# Patient Record
Sex: FEMALE | Race: WHITE | NOT HISPANIC OR LATINO | Employment: OTHER | URBAN - METROPOLITAN AREA
[De-identification: names, ages, dates, MRNs, and addresses within clinical notes are randomized per-mention and may not be internally consistent; named-entity substitution may affect disease eponyms.]

---

## 2017-05-09 ENCOUNTER — COMMUNICATION - HEALTHEAST (OUTPATIENT)
Dept: SCHEDULING | Facility: CLINIC | Age: 67
End: 2017-05-09

## 2017-05-10 ENCOUNTER — OFFICE VISIT - HEALTHEAST (OUTPATIENT)
Dept: FAMILY MEDICINE | Facility: CLINIC | Age: 67
End: 2017-05-10

## 2017-05-10 DIAGNOSIS — J30.9 ALLERGIC RHINITIS: ICD-10-CM

## 2017-05-10 DIAGNOSIS — S39.92XA INJURY OF COCCYX, INITIAL ENCOUNTER: ICD-10-CM

## 2017-05-10 ASSESSMENT — MIFFLIN-ST. JEOR: SCORE: 1134.57

## 2017-05-19 ENCOUNTER — COMMUNICATION - HEALTHEAST (OUTPATIENT)
Dept: SCHEDULING | Facility: CLINIC | Age: 67
End: 2017-05-19

## 2017-05-20 ENCOUNTER — COMMUNICATION - HEALTHEAST (OUTPATIENT)
Dept: SCHEDULING | Facility: CLINIC | Age: 67
End: 2017-05-20

## 2017-07-31 ENCOUNTER — COMMUNICATION - HEALTHEAST (OUTPATIENT)
Dept: FAMILY MEDICINE | Facility: CLINIC | Age: 67
End: 2017-07-31

## 2017-07-31 DIAGNOSIS — G47.00 INSOMNIA: ICD-10-CM

## 2018-01-16 ENCOUNTER — COMMUNICATION - HEALTHEAST (OUTPATIENT)
Dept: FAMILY MEDICINE | Facility: CLINIC | Age: 68
End: 2018-01-16

## 2018-01-16 DIAGNOSIS — G47.00 INSOMNIA: ICD-10-CM

## 2018-04-04 ENCOUNTER — COMMUNICATION - HEALTHEAST (OUTPATIENT)
Dept: SCHEDULING | Facility: CLINIC | Age: 68
End: 2018-04-04

## 2018-04-05 ENCOUNTER — COMMUNICATION - HEALTHEAST (OUTPATIENT)
Dept: TELEHEALTH | Facility: CLINIC | Age: 68
End: 2018-04-05

## 2018-04-05 ENCOUNTER — OFFICE VISIT - HEALTHEAST (OUTPATIENT)
Dept: FAMILY MEDICINE | Facility: CLINIC | Age: 68
End: 2018-04-05

## 2018-04-05 DIAGNOSIS — F41.8 SITUATIONAL ANXIETY: ICD-10-CM

## 2018-04-05 DIAGNOSIS — R19.7 DIARRHEA: ICD-10-CM

## 2018-04-05 DIAGNOSIS — M62.838 MUSCLE SPASM: ICD-10-CM

## 2018-04-05 ASSESSMENT — MIFFLIN-ST. JEOR: SCORE: 1128.05

## 2018-04-23 ENCOUNTER — OFFICE VISIT - HEALTHEAST (OUTPATIENT)
Dept: FAMILY MEDICINE | Facility: CLINIC | Age: 68
End: 2018-04-23

## 2018-04-23 DIAGNOSIS — J30.9 ALLERGIC RHINITIS: ICD-10-CM

## 2018-04-23 DIAGNOSIS — Z12.4 SCREENING FOR CERVICAL CANCER: ICD-10-CM

## 2018-04-23 DIAGNOSIS — Z13.220 SCREENING FOR LIPID DISORDERS: ICD-10-CM

## 2018-04-23 DIAGNOSIS — Z13.1 SCREENING FOR DIABETES MELLITUS: ICD-10-CM

## 2018-04-23 DIAGNOSIS — Z12.31 VISIT FOR SCREENING MAMMOGRAM: ICD-10-CM

## 2018-04-23 DIAGNOSIS — Z00.00 ROUTINE GENERAL MEDICAL EXAMINATION AT A HEALTH CARE FACILITY: ICD-10-CM

## 2018-04-23 DIAGNOSIS — Z12.11 SCREEN FOR COLON CANCER: ICD-10-CM

## 2018-04-23 DIAGNOSIS — Z86.2 HISTORY OF ANEMIA: ICD-10-CM

## 2018-04-23 DIAGNOSIS — F41.8 SITUATIONAL ANXIETY: ICD-10-CM

## 2018-04-23 LAB
CHOLEST SERPL-MCNC: 226 MG/DL
FASTING STATUS PATIENT QL REPORTED: YES
FASTING STATUS PATIENT QL REPORTED: YES
GLUCOSE BLD-MCNC: 85 MG/DL (ref 70–99)
HDLC SERPL-MCNC: 66 MG/DL
HGB BLD-MCNC: 13.4 G/DL (ref 12–16)
LDLC SERPL CALC-MCNC: 147 MG/DL
TRIGL SERPL-MCNC: 66 MG/DL

## 2018-04-23 ASSESSMENT — MIFFLIN-ST. JEOR: SCORE: 1102.25

## 2018-04-24 LAB
HPV SOURCE: NORMAL
HUMAN PAPILLOMA VIRUS 16 DNA: NEGATIVE
HUMAN PAPILLOMA VIRUS 18 DNA: NEGATIVE
HUMAN PAPILLOMA VIRUS FINAL DIAGNOSIS: NORMAL
HUMAN PAPILLOMA VIRUS OTHER HR: NEGATIVE
SPECIMEN DESCRIPTION: NORMAL

## 2018-04-26 ENCOUNTER — COMMUNICATION - HEALTHEAST (OUTPATIENT)
Dept: FAMILY MEDICINE | Facility: CLINIC | Age: 68
End: 2018-04-26

## 2018-04-26 DIAGNOSIS — G47.00 INSOMNIA: ICD-10-CM

## 2018-04-27 LAB
BKR LAB AP ABNORMAL BLEEDING: NO
BKR LAB AP BIRTH CONTROL/HORMONES: NORMAL
BKR LAB AP CERVICAL APPEARANCE: NORMAL
BKR LAB AP GYN ADEQUACY: NORMAL
BKR LAB AP GYN INTERPRETATION: NORMAL
BKR LAB AP HPV REFLEX: NORMAL
BKR LAB AP LMP: 1997
BKR LAB AP PATIENT STATUS: NORMAL
BKR LAB AP PREVIOUS ABNORMAL: NORMAL
BKR LAB AP PREVIOUS NORMAL: 2012
HIGH RISK?: NO
PATH REPORT.COMMENTS IMP SPEC: NORMAL
RESULT FLAG (HE HISTORICAL CONVERSION): NORMAL

## 2018-06-04 ENCOUNTER — HOSPITAL ENCOUNTER (OUTPATIENT)
Dept: MAMMOGRAPHY | Facility: CLINIC | Age: 68
Discharge: HOME | End: 2018-06-04
Attending: FAMILY MEDICINE

## 2018-06-04 DIAGNOSIS — Z12.31 VISIT FOR SCREENING MAMMOGRAM: ICD-10-CM

## 2018-09-25 ENCOUNTER — OFFICE VISIT - HEALTHEAST (OUTPATIENT)
Dept: FAMILY MEDICINE | Facility: CLINIC | Age: 68
End: 2018-09-25

## 2018-09-25 ENCOUNTER — RECORDS - HEALTHEAST (OUTPATIENT)
Dept: GENERAL RADIOLOGY | Facility: CLINIC | Age: 68
End: 2018-09-25

## 2018-09-25 DIAGNOSIS — R10.9 ACUTE RIGHT FLANK PAIN: ICD-10-CM

## 2018-09-25 DIAGNOSIS — R10.9 UNSPECIFIED ABDOMINAL PAIN: ICD-10-CM

## 2018-09-25 LAB
ALBUMIN SERPL-MCNC: 4 G/DL (ref 3.5–5)
ALBUMIN UR-MCNC: NEGATIVE MG/DL
ALP SERPL-CCNC: 59 U/L (ref 45–120)
ALT SERPL W P-5'-P-CCNC: 10 U/L (ref 0–45)
ANION GAP SERPL CALCULATED.3IONS-SCNC: 9 MMOL/L (ref 5–18)
APPEARANCE UR: CLEAR
AST SERPL W P-5'-P-CCNC: 14 U/L (ref 0–40)
BASOPHILS # BLD AUTO: 0.1 THOU/UL (ref 0–0.2)
BASOPHILS NFR BLD AUTO: 1 % (ref 0–2)
BILIRUB SERPL-MCNC: 0.5 MG/DL (ref 0–1)
BILIRUB UR QL STRIP: NEGATIVE
BUN SERPL-MCNC: 13 MG/DL (ref 8–22)
CALCIUM SERPL-MCNC: 9.5 MG/DL (ref 8.5–10.5)
CHLORIDE BLD-SCNC: 105 MMOL/L (ref 98–107)
CO2 SERPL-SCNC: 26 MMOL/L (ref 22–31)
COLOR UR AUTO: YELLOW
CREAT SERPL-MCNC: 0.65 MG/DL (ref 0.6–1.1)
EOSINOPHIL # BLD AUTO: 0.1 THOU/UL (ref 0–0.4)
EOSINOPHIL NFR BLD AUTO: 2 % (ref 0–6)
ERYTHROCYTE [DISTWIDTH] IN BLOOD BY AUTOMATED COUNT: 11 % (ref 11–14.5)
GFR SERPL CREATININE-BSD FRML MDRD: >60 ML/MIN/1.73M2
GLUCOSE BLD-MCNC: 90 MG/DL (ref 70–125)
GLUCOSE UR STRIP-MCNC: NEGATIVE MG/DL
HCT VFR BLD AUTO: 37.9 % (ref 35–47)
HGB BLD-MCNC: 13 G/DL (ref 12–16)
HGB UR QL STRIP: ABNORMAL
KETONES UR STRIP-MCNC: NEGATIVE MG/DL
LEUKOCYTE ESTERASE UR QL STRIP: NEGATIVE
LIPASE SERPL-CCNC: 29 U/L (ref 0–52)
LYMPHOCYTES # BLD AUTO: 2 THOU/UL (ref 0.8–4.4)
LYMPHOCYTES NFR BLD AUTO: 29 % (ref 20–40)
MCH RBC QN AUTO: 30.5 PG (ref 27–34)
MCHC RBC AUTO-ENTMCNC: 34.2 G/DL (ref 32–36)
MCV RBC AUTO: 89 FL (ref 80–100)
MONOCYTES # BLD AUTO: 0.6 THOU/UL (ref 0–0.9)
MONOCYTES NFR BLD AUTO: 9 % (ref 2–10)
NEUTROPHILS # BLD AUTO: 4.2 THOU/UL (ref 2–7.7)
NEUTROPHILS NFR BLD AUTO: 60 % (ref 50–70)
NITRATE UR QL: NEGATIVE
PH UR STRIP: 7 [PH] (ref 5–8)
PLATELET # BLD AUTO: 216 THOU/UL (ref 140–440)
PMV BLD AUTO: 8.6 FL (ref 7–10)
POTASSIUM BLD-SCNC: 4.5 MMOL/L (ref 3.5–5)
PROT SERPL-MCNC: 6.4 G/DL (ref 6–8)
RBC # BLD AUTO: 4.24 MILL/UL (ref 3.8–5.4)
RBC #/AREA URNS AUTO: ABNORMAL HPF
SODIUM SERPL-SCNC: 140 MMOL/L (ref 136–145)
SP GR UR STRIP: 1.02 (ref 1–1.03)
SQUAMOUS #/AREA URNS AUTO: ABNORMAL LPF
UROBILINOGEN UR STRIP-ACNC: ABNORMAL
WBC: 6.9 THOU/UL (ref 4–11)

## 2018-09-25 ASSESSMENT — MIFFLIN-ST. JEOR: SCORE: 1102.54

## 2018-10-04 ENCOUNTER — COMMUNICATION - HEALTHEAST (OUTPATIENT)
Dept: FAMILY MEDICINE | Facility: CLINIC | Age: 68
End: 2018-10-04

## 2018-10-04 DIAGNOSIS — J44.9 COPD (CHRONIC OBSTRUCTIVE PULMONARY DISEASE) (H): ICD-10-CM

## 2019-03-04 ENCOUNTER — COMMUNICATION - HEALTHEAST (OUTPATIENT)
Dept: FAMILY MEDICINE | Facility: CLINIC | Age: 69
End: 2019-03-04

## 2019-03-04 DIAGNOSIS — G47.00 INSOMNIA: ICD-10-CM

## 2019-03-19 ENCOUNTER — COMMUNICATION - HEALTHEAST (OUTPATIENT)
Dept: FAMILY MEDICINE | Facility: CLINIC | Age: 69
End: 2019-03-19

## 2019-03-19 DIAGNOSIS — F41.8 SITUATIONAL ANXIETY: ICD-10-CM

## 2019-06-21 ENCOUNTER — OFFICE VISIT - HEALTHEAST (OUTPATIENT)
Dept: FAMILY MEDICINE | Facility: CLINIC | Age: 69
End: 2019-06-21

## 2019-06-21 DIAGNOSIS — Z12.11 SCREEN FOR COLON CANCER: ICD-10-CM

## 2019-06-21 DIAGNOSIS — S32.9XXA CLOSED NONDISPLACED FRACTURE OF PELVIS, UNSPECIFIED PART OF PELVIS, INITIAL ENCOUNTER (H): ICD-10-CM

## 2019-06-21 DIAGNOSIS — S02.40CA CLOSED FRACTURE OF RIGHT SIDE OF MAXILLA, INITIAL ENCOUNTER (H): ICD-10-CM
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
## 2019-06-21 ASSESSMENT — MIFFLIN-ST. JEOR: SCORE: 1092.9

## 2019-06-26 ENCOUNTER — RECORDS - HEALTHEAST (OUTPATIENT)
Dept: ADMINISTRATIVE | Facility: OTHER | Age: 69
End: 2019-06-26

## 2019-06-27 ENCOUNTER — OFFICE VISIT (OUTPATIENT)
Dept: ORTHOPEDICS | Facility: CLINIC | Age: 69
End: 2019-06-27
Payer: COMMERCIAL

## 2019-06-27 VITALS
RESPIRATION RATE: 16 BRPM | HEART RATE: 72 BPM | WEIGHT: 125.7 LBS | SYSTOLIC BLOOD PRESSURE: 149 MMHG | BODY MASS INDEX: 19.05 KG/M2 | HEIGHT: 68 IN | DIASTOLIC BLOOD PRESSURE: 73 MMHG
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
## 2019-06-27 DIAGNOSIS — S32.592A CLOSED BILATERAL FRACTURE OF PUBIC RAMI, INITIAL ENCOUNTER (H): Primary | ICD-10-CM

## 2019-06-27 DIAGNOSIS — S32.10XA CLOSED FRACTURE OF SACRUM, UNSPECIFIED PORTION OF SACRUM, INITIAL ENCOUNTER (H): ICD-10-CM

## 2019-06-27 DIAGNOSIS — S32.591A CLOSED BILATERAL FRACTURE OF PUBIC RAMI, INITIAL ENCOUNTER (H): Primary | ICD-10-CM

## 2019-06-27 PROCEDURE — 99204 OFFICE O/P NEW MOD 45 MIN: CPT | Performed by: ORTHOPAEDIC SURGERY

## 2019-06-27 RX ORDER — ALBUTEROL SULFATE 90 UG/1
AEROSOL, METERED RESPIRATORY (INHALATION)
COMMUNITY
Start: 2018-10-06 | End: 2022-10-26

## 2019-06-27 RX ORDER — TRAZODONE HYDROCHLORIDE 50 MG/1
50 TABLET, FILM COATED ORAL AT BEDTIME
Refills: 1 | COMMUNITY
Start: 2019-05-30 | End: 2021-09-13

## 2019-06-27 RX ORDER — AZELASTINE HYDROCHLORIDE 0.5 MG/ML
SOLUTION/ DROPS OPHTHALMIC
COMMUNITY
Start: 2018-04-23 | End: 2023-03-19

## 2019-06-27 RX ORDER — NAPROXEN 500 MG/1
500 TABLET ORAL
COMMUNITY
Start: 2019-06-21 | End: 2021-09-13

## 2019-06-27 ASSESSMENT — PAIN SCALES - GENERAL: PAINLEVEL: EXTREME PAIN (9)

## 2019-06-27 ASSESSMENT — MIFFLIN-ST. JEOR: SCORE: 1138.67
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
## 2019-07-09 ENCOUNTER — TELEPHONE (OUTPATIENT)
Dept: ORTHOPEDICS | Facility: CLINIC | Age: 69
End: 2019-07-09

## 2019-07-09 NOTE — TELEPHONE ENCOUNTER
Patient would like a call regarding disability paperwork for the insurance company. Please call to advise.
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
## 2019-07-09 NOTE — TELEPHONE ENCOUNTER
Called and spoke to patient. I gave her our fax number that [place] could fax us the paperwork to fill out. Add claim # [id number]. She thanked me for calling.  Inge Lincoln Certified Medical Assistant

## 2019-07-10 NOTE — TELEPHONE ENCOUNTER
Called and spoke to patient. 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 I let her know I would write this letter today. She would like it faxed to [employer] at [phone]. I faxed the letter and confirmed receipt.   Inge Lincoln Certified Medical Assistant

## 2019-08-08 ENCOUNTER — OFFICE VISIT (OUTPATIENT)
Dept: ORTHOPEDICS | Facility: CLINIC | Age: 69
End: 2019-08-08
Payer: COMMERCIAL

## 2019-08-08 ENCOUNTER — ANCILLARY PROCEDURE (OUTPATIENT)
Dept: GENERAL RADIOLOGY | Facility: CLINIC | Age: 69
End: 2019-08-08
Attending: ORTHOPAEDIC SURGERY
Payer: COMMERCIAL

## 2019-08-08 VITALS
HEART RATE: 77 BPM | WEIGHT: 125 LBS | SYSTOLIC BLOOD PRESSURE: 133 MMHG | OXYGEN SATURATION: 94 % | BODY MASS INDEX: 19.01 KG/M2 | DIASTOLIC BLOOD PRESSURE: 67 MMHG
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
## 2019-08-08 DIAGNOSIS — S32.10XA CLOSED FRACTURE OF SACRUM, UNSPECIFIED PORTION OF SACRUM, INITIAL ENCOUNTER (H): ICD-10-CM
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
## 2019-08-08 DIAGNOSIS — S32.592A CLOSED BILATERAL FRACTURE OF PUBIC RAMI, INITIAL ENCOUNTER (H): ICD-10-CM

## 2019-08-08 DIAGNOSIS — S32.591D CLOSED BILATERAL FRACTURE OF PUBIC RAMI WITH ROUTINE HEALING, SUBSEQUENT ENCOUNTER: ICD-10-CM

## 2019-08-08 DIAGNOSIS — S32.10XD CLOSED FRACTURE OF SACRUM WITH ROUTINE HEALING, UNSPECIFIED PORTION OF SACRUM, SUBSEQUENT ENCOUNTER: Primary | ICD-10-CM

## 2019-08-08 DIAGNOSIS — S32.591A CLOSED BILATERAL FRACTURE OF PUBIC RAMI, INITIAL ENCOUNTER (H): ICD-10-CM

## 2019-08-08 DIAGNOSIS — S32.592D CLOSED BILATERAL FRACTURE OF PUBIC RAMI WITH ROUTINE HEALING, SUBSEQUENT ENCOUNTER: ICD-10-CM

## 2019-08-08 PROCEDURE — 72170 X-RAY EXAM OF PELVIS: CPT

## 2019-08-08 PROCEDURE — 99213 OFFICE O/P EST LOW 20 MIN: CPT | Performed by: ORTHOPAEDIC SURGERY

## 2019-08-08 ASSESSMENT — PAIN SCALES - GENERAL: PAINLEVEL: EXTREME PAIN (8)
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
## 2019-08-08 NOTE — LETTER
August 8, 2019      Gabby Donis  [address] [facility] [address]        To Whom It May Concern:    Gabby Donis was seen in our clinic. She may not return to work at this time.  Return to clinic in 6 weeks for further evaluation.      Sincerely,        Messi Landers MD

## 2019-08-30 ENCOUNTER — COMMUNICATION - HEALTHEAST (OUTPATIENT)
Dept: FAMILY MEDICINE | Facility: CLINIC | Age: 69
End: 2019-08-30

## 2019-08-30 DIAGNOSIS — G47.00 INSOMNIA: ICD-10-CM

## 2019-09-11 ENCOUNTER — COMMUNICATION - HEALTHEAST (OUTPATIENT)
Dept: FAMILY MEDICINE | Facility: CLINIC | Age: 69
End: 2019-09-11

## 2019-09-11 DIAGNOSIS — S02.40CA CLOSED FRACTURE OF RIGHT SIDE OF MAXILLA, INITIAL ENCOUNTER (H): ICD-10-CM

## 2019-09-11 DIAGNOSIS — S32.9XXA CLOSED NONDISPLACED FRACTURE OF PELVIS, UNSPECIFIED PART OF PELVIS, INITIAL ENCOUNTER (H): ICD-10-CM

## 2019-09-19 ENCOUNTER — ANCILLARY PROCEDURE (OUTPATIENT)
Dept: GENERAL RADIOLOGY | Facility: CLINIC | Age: 69
End: 2019-09-19
Attending: PHYSICIAN ASSISTANT
Payer: COMMERCIAL

## 2019-09-19 ENCOUNTER — OFFICE VISIT (OUTPATIENT)
Dept: ORTHOPEDICS | Facility: CLINIC | Age: 69
End: 2019-09-19
Payer: COMMERCIAL

## 2019-09-19 VITALS
SYSTOLIC BLOOD PRESSURE: 115 MMHG | HEIGHT: 68 IN | WEIGHT: 123.3 LBS | BODY MASS INDEX: 18.69 KG/M2 | DIASTOLIC BLOOD PRESSURE: 70 MMHG | RESPIRATION RATE: 16 BRPM

## 2019-09-19 DIAGNOSIS — S32.10XD CLOSED FRACTURE OF SACRUM WITH ROUTINE HEALING, UNSPECIFIED PORTION OF SACRUM, SUBSEQUENT ENCOUNTER: ICD-10-CM

## 2019-09-19 DIAGNOSIS — S32.592D CLOSED BILATERAL FRACTURE OF PUBIC RAMI WITH ROUTINE HEALING, SUBSEQUENT ENCOUNTER: Primary | ICD-10-CM

## 2019-09-19 DIAGNOSIS — S32.591D CLOSED BILATERAL FRACTURE OF PUBIC RAMI WITH ROUTINE HEALING, SUBSEQUENT ENCOUNTER: Primary | ICD-10-CM

## 2019-09-19 PROCEDURE — 99213 OFFICE O/P EST LOW 20 MIN: CPT | Performed by: ORTHOPAEDIC SURGERY

## 2019-09-19 PROCEDURE — 72170 X-RAY EXAM OF PELVIS: CPT

## 2019-09-19 ASSESSMENT — PAIN SCALES - GENERAL: PAINLEVEL: EXTREME PAIN (8)

## 2019-09-19 ASSESSMENT — MIFFLIN-ST. JEOR: SCORE: 1127.79
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
## 2019-10-31 ENCOUNTER — OFFICE VISIT (OUTPATIENT)
Dept: ORTHOPEDICS | Facility: CLINIC | Age: 69
End: 2019-10-31
Payer: COMMERCIAL

## 2019-10-31 ENCOUNTER — ANCILLARY PROCEDURE (OUTPATIENT)
Dept: GENERAL RADIOLOGY | Facility: CLINIC | Age: 69
End: 2019-10-31
Attending: ORTHOPAEDIC SURGERY
Payer: COMMERCIAL

## 2019-10-31 ENCOUNTER — COMMUNICATION - HEALTHEAST (OUTPATIENT)
Dept: FAMILY MEDICINE | Facility: CLINIC | Age: 69
End: 2019-10-31

## 2019-10-31 VITALS
DIASTOLIC BLOOD PRESSURE: 72 MMHG | BODY MASS INDEX: 18.73 KG/M2 | WEIGHT: 123.6 LBS | HEART RATE: 87 BPM | HEIGHT: 68 IN | SYSTOLIC BLOOD PRESSURE: 126 MMHG

## 2019-10-31 DIAGNOSIS — S32.591D CLOSED BILATERAL FRACTURE OF PUBIC RAMI WITH ROUTINE HEALING, SUBSEQUENT ENCOUNTER: Primary | ICD-10-CM

## 2019-10-31 DIAGNOSIS — S32.592D CLOSED BILATERAL FRACTURE OF PUBIC RAMI WITH ROUTINE HEALING, SUBSEQUENT ENCOUNTER: ICD-10-CM

## 2019-10-31 DIAGNOSIS — S32.591D CLOSED BILATERAL FRACTURE OF PUBIC RAMI WITH ROUTINE HEALING, SUBSEQUENT ENCOUNTER: ICD-10-CM

## 2019-10-31 DIAGNOSIS — M76.31 ILIOTIBIAL BAND TENDONITIS, RIGHT: ICD-10-CM

## 2019-10-31 DIAGNOSIS — S32.592D CLOSED BILATERAL FRACTURE OF PUBIC RAMI WITH ROUTINE HEALING, SUBSEQUENT ENCOUNTER: Primary | ICD-10-CM

## 2019-10-31 DIAGNOSIS — S02.40CA CLOSED FRACTURE OF RIGHT SIDE OF MAXILLA, INITIAL ENCOUNTER (H): ICD-10-CM

## 2019-10-31 DIAGNOSIS — S32.9XXA CLOSED NONDISPLACED FRACTURE OF PELVIS, UNSPECIFIED PART OF PELVIS, INITIAL ENCOUNTER (H): ICD-10-CM

## 2019-10-31 PROCEDURE — 72170 X-RAY EXAM OF PELVIS: CPT

## 2019-10-31 PROCEDURE — 99213 OFFICE O/P EST LOW 20 MIN: CPT | Performed by: ORTHOPAEDIC SURGERY

## 2019-10-31 ASSESSMENT — MIFFLIN-ST. JEOR: SCORE: 1129.15

## 2019-10-31 ASSESSMENT — PAIN SCALES - GENERAL: PAINLEVEL: EXTREME PAIN (8)
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
## 2019-12-04 ENCOUNTER — THERAPY VISIT (OUTPATIENT)
Dept: PHYSICAL THERAPY | Facility: CLINIC | Age: 69
End: 2019-12-04
Payer: COMMERCIAL

## 2019-12-04 DIAGNOSIS — S32.592D CLOSED BILATERAL FRACTURE OF PUBIC RAMI WITH ROUTINE HEALING, SUBSEQUENT ENCOUNTER: ICD-10-CM

## 2019-12-04 DIAGNOSIS — M25.551 PAIN IN JOINT, PELVIC REGION AND THIGH, RIGHT: ICD-10-CM

## 2019-12-04 DIAGNOSIS — M53.3 SI (SACROILIAC) JOINT DYSFUNCTION: ICD-10-CM

## 2019-12-04 DIAGNOSIS — R53.81 PHYSICAL DECONDITIONING: ICD-10-CM

## 2019-12-04 DIAGNOSIS — S32.591D CLOSED BILATERAL FRACTURE OF PUBIC RAMI WITH ROUTINE HEALING, SUBSEQUENT ENCOUNTER: ICD-10-CM

## 2019-12-04 DIAGNOSIS — M76.31 ILIOTIBIAL BAND TENDONITIS, RIGHT: ICD-10-CM

## 2019-12-04 PROCEDURE — 97161 PT EVAL LOW COMPLEX 20 MIN: CPT | Mod: GP | Performed by: PHYSICAL THERAPIST

## 2019-12-04 PROCEDURE — 97112 NEUROMUSCULAR REEDUCATION: CPT | Mod: GP | Performed by: PHYSICAL THERAPIST
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
## 2019-12-11 ENCOUNTER — THERAPY VISIT (OUTPATIENT)
Dept: PHYSICAL THERAPY | Facility: CLINIC | Age: 69
End: 2019-12-11
Payer: COMMERCIAL

## 2019-12-11 DIAGNOSIS — M53.3 SI (SACROILIAC) JOINT DYSFUNCTION: ICD-10-CM

## 2019-12-11 DIAGNOSIS — R53.81 PHYSICAL DECONDITIONING: ICD-10-CM

## 2019-12-11 DIAGNOSIS — M25.551 PAIN IN JOINT, PELVIC REGION AND THIGH, RIGHT: ICD-10-CM

## 2019-12-11 PROCEDURE — 97110 THERAPEUTIC EXERCISES: CPT | Mod: GP | Performed by: PHYSICAL THERAPIST

## 2019-12-11 PROCEDURE — 97112 NEUROMUSCULAR REEDUCATION: CPT | Mod: GP | Performed by: PHYSICAL THERAPIST

## 2019-12-19 ENCOUNTER — THERAPY VISIT (OUTPATIENT)
Dept: PHYSICAL THERAPY | Facility: CLINIC | Age: 69
End: 2019-12-19
Payer: COMMERCIAL

## 2019-12-19 DIAGNOSIS — R53.81 PHYSICAL DECONDITIONING: ICD-10-CM

## 2019-12-19 DIAGNOSIS — M53.3 SI (SACROILIAC) JOINT DYSFUNCTION: ICD-10-CM

## 2019-12-19 DIAGNOSIS — M25.551 PAIN IN JOINT, PELVIC REGION AND THIGH, RIGHT: ICD-10-CM

## 2019-12-19 PROCEDURE — 97110 THERAPEUTIC EXERCISES: CPT | Mod: GP | Performed by: PHYSICAL THERAPIST

## 2019-12-19 PROCEDURE — 97112 NEUROMUSCULAR REEDUCATION: CPT | Mod: GP | Performed by: PHYSICAL THERAPIST

## 2019-12-27 ENCOUNTER — COMMUNICATION - HEALTHEAST (OUTPATIENT)
Dept: FAMILY MEDICINE | Facility: CLINIC | Age: 69
End: 2019-12-27

## 2019-12-27 DIAGNOSIS — J30.9 ALLERGIC RHINITIS: ICD-10-CM

## 2020-01-02 ENCOUNTER — THERAPY VISIT (OUTPATIENT)
Dept: PHYSICAL THERAPY | Facility: CLINIC | Age: 70
End: 2020-01-02
Payer: COMMERCIAL

## 2020-01-02 DIAGNOSIS — R53.81 PHYSICAL DECONDITIONING: ICD-10-CM

## 2020-01-02 DIAGNOSIS — M25.551 PAIN IN JOINT, PELVIC REGION AND THIGH, RIGHT: ICD-10-CM

## 2020-01-02 DIAGNOSIS — M53.3 SI (SACROILIAC) JOINT DYSFUNCTION: ICD-10-CM

## 2020-01-02 PROCEDURE — 97112 NEUROMUSCULAR REEDUCATION: CPT | Mod: GP | Performed by: PHYSICAL THERAPIST

## 2020-01-02 PROCEDURE — 97530 THERAPEUTIC ACTIVITIES: CPT | Mod: GP | Performed by: PHYSICAL THERAPIST

## 2020-01-02 PROCEDURE — 97110 THERAPEUTIC EXERCISES: CPT | Mod: GP | Performed by: PHYSICAL THERAPIST

## 2020-01-06 ENCOUNTER — ANCILLARY PROCEDURE (OUTPATIENT)
Dept: GENERAL RADIOLOGY | Facility: CLINIC | Age: 70
End: 2020-01-06
Attending: ORTHOPAEDIC SURGERY
Payer: COMMERCIAL

## 2020-01-06 ENCOUNTER — OFFICE VISIT (OUTPATIENT)
Dept: ORTHOPEDICS | Facility: CLINIC | Age: 70
End: 2020-01-06
Payer: COMMERCIAL

## 2020-01-06 VITALS
WEIGHT: 123.1 LBS | SYSTOLIC BLOOD PRESSURE: 138 MMHG | HEIGHT: 68 IN | HEART RATE: 67 BPM | OXYGEN SATURATION: 96 % | BODY MASS INDEX: 18.66 KG/M2 | DIASTOLIC BLOOD PRESSURE: 57 MMHG

## 2020-01-06 DIAGNOSIS — S32.592D CLOSED BILATERAL FRACTURE OF PUBIC RAMI WITH ROUTINE HEALING, SUBSEQUENT ENCOUNTER: Primary | ICD-10-CM

## 2020-01-06 DIAGNOSIS — S32.591D CLOSED BILATERAL FRACTURE OF PUBIC RAMI WITH ROUTINE HEALING, SUBSEQUENT ENCOUNTER: Primary | ICD-10-CM

## 2020-01-06 DIAGNOSIS — S32.592D CLOSED BILATERAL FRACTURE OF PUBIC RAMI WITH ROUTINE HEALING, SUBSEQUENT ENCOUNTER: ICD-10-CM

## 2020-01-06 DIAGNOSIS — S32.591D CLOSED BILATERAL FRACTURE OF PUBIC RAMI WITH ROUTINE HEALING, SUBSEQUENT ENCOUNTER: ICD-10-CM

## 2020-01-06 DIAGNOSIS — M25.551 GREATER TROCHANTERIC PAIN SYNDROME OF RIGHT LOWER EXTREMITY: ICD-10-CM

## 2020-01-06 PROCEDURE — 20610 DRAIN/INJ JOINT/BURSA W/O US: CPT | Mod: RT | Performed by: ORTHOPAEDIC SURGERY

## 2020-01-06 PROCEDURE — 72170 X-RAY EXAM OF PELVIS: CPT

## 2020-01-06 PROCEDURE — 99213 OFFICE O/P EST LOW 20 MIN: CPT | Mod: 25 | Performed by: ORTHOPAEDIC SURGERY

## 2020-01-06 RX ORDER — BUPIVACAINE HYDROCHLORIDE 2.5 MG/ML
3 INJECTION, SOLUTION INFILTRATION; PERINEURAL
Status: DISCONTINUED | OUTPATIENT
Start: 2020-01-06 | End: 2021-09-13

## 2020-01-06 RX ORDER — LIDOCAINE HYDROCHLORIDE 10 MG/ML
4 INJECTION, SOLUTION INFILTRATION; PERINEURAL
Status: DISCONTINUED | OUTPATIENT
Start: 2020-01-06 | End: 2021-09-13

## 2020-01-06 RX ORDER — TRIAMCINOLONE ACETONIDE 40 MG/ML
80 INJECTION, SUSPENSION INTRA-ARTICULAR; INTRAMUSCULAR
Status: DISCONTINUED | OUTPATIENT
Start: 2020-01-06 | End: 2021-09-13

## 2020-01-06 RX ADMIN — BUPIVACAINE HYDROCHLORIDE 3 ML: 2.5 INJECTION, SOLUTION INFILTRATION; PERINEURAL at 13:30

## 2020-01-06 RX ADMIN — LIDOCAINE HYDROCHLORIDE 4 ML: 10 INJECTION, SOLUTION INFILTRATION; PERINEURAL at 13:30

## 2020-01-06 RX ADMIN — TRIAMCINOLONE ACETONIDE 80 MG: 40 INJECTION, SUSPENSION INTRA-ARTICULAR; INTRAMUSCULAR at 13:30

## 2020-01-06 ASSESSMENT — MIFFLIN-ST. JEOR: SCORE: 1121.88

## 2020-01-06 ASSESSMENT — PAIN SCALES - GENERAL: PAINLEVEL: SEVERE PAIN (7)
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
## 2020-01-23 ENCOUNTER — THERAPY VISIT (OUTPATIENT)
Dept: PHYSICAL THERAPY | Facility: CLINIC | Age: 70
End: 2020-01-23
Payer: COMMERCIAL

## 2020-01-23 DIAGNOSIS — M25.551 PAIN IN JOINT, PELVIC REGION AND THIGH, RIGHT: ICD-10-CM

## 2020-01-23 DIAGNOSIS — M53.3 SI (SACROILIAC) JOINT DYSFUNCTION: ICD-10-CM

## 2020-01-23 DIAGNOSIS — R53.81 PHYSICAL DECONDITIONING: Primary | ICD-10-CM

## 2020-01-23 PROCEDURE — 97140 MANUAL THERAPY 1/> REGIONS: CPT | Mod: GP | Performed by: PHYSICAL THERAPIST

## 2020-01-23 PROCEDURE — 97110 THERAPEUTIC EXERCISES: CPT | Mod: GP | Performed by: PHYSICAL THERAPIST

## 2020-01-23 PROCEDURE — 97530 THERAPEUTIC ACTIVITIES: CPT | Mod: GP | Performed by: PHYSICAL THERAPIST

## 2020-01-28 ENCOUNTER — COMMUNICATION - HEALTHEAST (OUTPATIENT)
Dept: FAMILY MEDICINE | Facility: CLINIC | Age: 70
End: 2020-01-28

## 2020-02-27 ENCOUNTER — OFFICE VISIT (OUTPATIENT)
Dept: ORTHOPEDICS | Facility: CLINIC | Age: 70
End: 2020-02-27
Payer: COMMERCIAL

## 2020-02-27 VITALS
HEIGHT: 68 IN | BODY MASS INDEX: 18.46 KG/M2 | WEIGHT: 121.8 LBS | DIASTOLIC BLOOD PRESSURE: 77 MMHG | SYSTOLIC BLOOD PRESSURE: 130 MMHG

## 2020-02-27 DIAGNOSIS — S32.592D CLOSED BILATERAL FRACTURE OF PUBIC RAMI WITH ROUTINE HEALING, SUBSEQUENT ENCOUNTER: Primary | ICD-10-CM

## 2020-02-27 DIAGNOSIS — S32.591D CLOSED BILATERAL FRACTURE OF PUBIC RAMI WITH ROUTINE HEALING, SUBSEQUENT ENCOUNTER: Primary | ICD-10-CM

## 2020-02-27 PROCEDURE — 99213 OFFICE O/P EST LOW 20 MIN: CPT | Performed by: ORTHOPAEDIC SURGERY

## 2020-02-27 ASSESSMENT — PAIN SCALES - GENERAL: PAINLEVEL: EXTREME PAIN (8)

## 2020-02-27 ASSESSMENT — MIFFLIN-ST. JEOR: SCORE: 1115.98
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
## 2020-03-04 ENCOUNTER — THERAPY VISIT (OUTPATIENT)
Dept: PHYSICAL THERAPY | Facility: CLINIC | Age: 70
End: 2020-03-04
Payer: COMMERCIAL

## 2020-03-04 DIAGNOSIS — M25.551 PAIN IN JOINT, PELVIC REGION AND THIGH, RIGHT: ICD-10-CM

## 2020-03-04 DIAGNOSIS — R53.81 PHYSICAL DECONDITIONING: ICD-10-CM

## 2020-03-04 DIAGNOSIS — M53.3 SI (SACROILIAC) JOINT DYSFUNCTION: ICD-10-CM

## 2020-03-04 PROCEDURE — 97110 THERAPEUTIC EXERCISES: CPT | Mod: GP | Performed by: PHYSICAL THERAPIST

## 2020-03-04 PROCEDURE — 97530 THERAPEUTIC ACTIVITIES: CPT | Mod: GP | Performed by: PHYSICAL THERAPIST

## 2020-03-04 PROCEDURE — 97112 NEUROMUSCULAR REEDUCATION: CPT | Mod: GP | Performed by: PHYSICAL THERAPIST

## 2020-03-04 NOTE — PROGRESS NOTES
Subjective:  HPI  Physical Exam                    Objective:  System    Physical Exam    General     ROS    Assessment/Plan:    PROGRESS  REPORT    Progress reporting period is from 12/4/19 to 3/4/2020.       SUBJECTIVE  Subjective changes noted by patient:  Pt returns after being on vacation. Did go see the MD and was cleared to return back to work for short periods of time. Went back for 3 hours on Sunday. Wasn't too sore after the shift.      Current Pain level: 6/10(off and on).      Initial Pain level: 8/10.   Changes in function:  Yes (See Goal flowsheet attached for changes in current functional level)  Adverse reaction to treatment or activity: None    OBJECTIVE  Changes noted in objective findings:  Yes,   Objective: endurance is improving however does continue to fatigue quickly overall. Does still get some pain with close chain activities on single leg.      ASSESSMENT/PLAN  Updated problem list and treatment plan: Diagnosis 1:  S/P pelvic fractures  Pain -  manual therapy, self management, education and home program  Decreased ROM/flexibility - manual therapy, therapeutic exercise, therapeutic activity and home program  Decreased strength - therapeutic exercise, therapeutic activities and home program  Impaired balance - neuro re-education, therapeutic activities and home program  Decreased proprioception - neuro re-education, therapeutic activities and home program  Impaired gait - gait training, assistive devices and home program  Impaired muscle performance - neuro re-education and home program  Decreased function - therapeutic activities and home program  STG/LTGs have been met or progress has been made towards goals:  Yes (See Goal flow sheet completed today.)  Assessment of Progress: The patient's condition is improving.  The patient's condition has potential to improve.  Self Management Plans:  Patient has been instructed in a home treatment program.  Patient  has been instructed in self  management of symptoms.  I have re-evaluated this patient and find that the nature, scope, duration and intensity of the therapy is appropriate for the medical condition of the patient.  Gabby continues to require the following intervention to meet STG and LTG's:  PT    Recommendations:  This patient would benefit from continued therapy.     Frequency:  1-2 X a month, once daily  Duration:  for 2-3 months        Please refer to the daily flowsheet for treatment today, total treatment time and time spent performing 1:1 timed codes.

## 2020-03-30 ENCOUNTER — COMMUNICATION - HEALTHEAST (OUTPATIENT)
Dept: FAMILY MEDICINE | Facility: CLINIC | Age: 70
End: 2020-03-30

## 2020-04-16 ENCOUNTER — COMMUNICATION - HEALTHEAST (OUTPATIENT)
Dept: FAMILY MEDICINE | Facility: CLINIC | Age: 70
End: 2020-04-16

## 2020-04-16 DIAGNOSIS — G47.00 INSOMNIA: ICD-10-CM

## 2020-08-03 ENCOUNTER — COMMUNICATION - HEALTHEAST (OUTPATIENT)
Dept: FAMILY MEDICINE | Facility: CLINIC | Age: 70
End: 2020-08-03

## 2020-08-03 DIAGNOSIS — G47.00 INSOMNIA: ICD-10-CM

## 2020-09-02 ENCOUNTER — COMMUNICATION - HEALTHEAST (OUTPATIENT)
Dept: SCHEDULING | Facility: CLINIC | Age: 70
End: 2020-09-02

## 2020-09-09 ENCOUNTER — OFFICE VISIT - HEALTHEAST (OUTPATIENT)
Dept: FAMILY MEDICINE | Facility: CLINIC | Age: 70
End: 2020-09-09

## 2020-09-09 DIAGNOSIS — Z78.0 POSTMENOPAUSAL: ICD-10-CM

## 2020-09-09 DIAGNOSIS — J44.9 COPD (CHRONIC OBSTRUCTIVE PULMONARY DISEASE) (H): ICD-10-CM

## 2020-09-09 DIAGNOSIS — R63.4 ABNORMAL WEIGHT LOSS: ICD-10-CM

## 2020-09-09 DIAGNOSIS — J30.2 SEASONAL ALLERGIC RHINITIS, UNSPECIFIED TRIGGER: ICD-10-CM

## 2020-09-09 DIAGNOSIS — Z12.31 VISIT FOR SCREENING MAMMOGRAM: ICD-10-CM

## 2020-09-09 DIAGNOSIS — Z00.00 ROUTINE GENERAL MEDICAL EXAMINATION AT A HEALTH CARE FACILITY: ICD-10-CM

## 2020-09-09 DIAGNOSIS — Z23 ENCOUNTER FOR IMMUNIZATION: ICD-10-CM

## 2020-09-09 DIAGNOSIS — Z23 INFLUENZA VACCINE NEEDED: ICD-10-CM

## 2020-09-09 DIAGNOSIS — K29.70 GASTRITIS WITHOUT BLEEDING, UNSPECIFIED CHRONICITY, UNSPECIFIED GASTRITIS TYPE: ICD-10-CM

## 2020-09-09 DIAGNOSIS — Z13.220 ENCOUNTER FOR SCREENING FOR LIPOID DISORDERS: ICD-10-CM

## 2020-09-09 DIAGNOSIS — T14.8XXA BRUISING: ICD-10-CM

## 2020-09-09 DIAGNOSIS — Z12.31 ENCOUNTER FOR SCREENING MAMMOGRAM FOR MALIGNANT NEOPLASM OF BREAST: ICD-10-CM

## 2020-09-09 DIAGNOSIS — Z13.1 ENCOUNTER FOR SCREENING FOR DIABETES MELLITUS: ICD-10-CM

## 2020-09-09 LAB
CHOLEST SERPL-MCNC: 216 MG/DL
ERYTHROCYTE [DISTWIDTH] IN BLOOD BY AUTOMATED COUNT: 11.4 % (ref 11–14.5)
FASTING STATUS PATIENT QL REPORTED: YES
FASTING STATUS PATIENT QL REPORTED: YES
GLUCOSE BLD-MCNC: 71 MG/DL (ref 70–99)
HCT VFR BLD AUTO: 42.9 % (ref 35–47)
HDLC SERPL-MCNC: 78 MG/DL
HGB BLD-MCNC: 14.1 G/DL (ref 12–16)
LDLC SERPL CALC-MCNC: 125 MG/DL
MCH RBC QN AUTO: 30.2 PG (ref 27–34)
MCHC RBC AUTO-ENTMCNC: 32.8 G/DL (ref 32–36)
MCV RBC AUTO: 92 FL (ref 80–100)
PLATELET # BLD AUTO: 206 THOU/UL (ref 140–440)
PMV BLD AUTO: 8.1 FL (ref 7–10)
RBC # BLD AUTO: 4.66 MILL/UL (ref 3.8–5.4)
TRIGL SERPL-MCNC: 66 MG/DL
TSH SERPL DL<=0.005 MIU/L-ACNC: 1.96 UIU/ML (ref 0.3–5)
WBC: 7.5 THOU/UL (ref 4–11)

## 2020-09-09 ASSESSMENT — MIFFLIN-ST. JEOR: SCORE: 1071.64

## 2020-10-30 ENCOUNTER — COMMUNICATION - HEALTHEAST (OUTPATIENT)
Dept: FAMILY MEDICINE | Facility: CLINIC | Age: 70
End: 2020-10-30

## 2020-10-30 DIAGNOSIS — G47.00 INSOMNIA: ICD-10-CM

## 2020-12-06 PROBLEM — M53.3 SI (SACROILIAC) JOINT DYSFUNCTION: Status: RESOLVED | Noted: 2019-12-04 | Resolved: 2020-12-06

## 2020-12-06 PROBLEM — R53.81 PHYSICAL DECONDITIONING: Status: RESOLVED | Noted: 2019-12-04 | Resolved: 2020-12-06

## 2020-12-06 PROBLEM — M25.551 PAIN IN JOINT, PELVIC REGION AND THIGH, RIGHT: Status: RESOLVED | Noted: 2019-12-04 | Resolved: 2020-12-06

## 2021-01-25 ENCOUNTER — COMMUNICATION - HEALTHEAST (OUTPATIENT)
Dept: FAMILY MEDICINE | Facility: CLINIC | Age: 71
End: 2021-01-25

## 2021-01-25 DIAGNOSIS — F41.8 SITUATIONAL ANXIETY: ICD-10-CM

## 2021-01-27 ENCOUNTER — TELEPHONE (OUTPATIENT)
Dept: FAMILY MEDICINE | Facility: CLINIC | Age: 71
End: 2021-01-27

## 2021-01-27 ENCOUNTER — COMMUNICATION - HEALTHEAST (OUTPATIENT)
Dept: ADMINISTRATIVE | Facility: CLINIC | Age: 71
End: 2021-01-27

## 2021-01-27 DIAGNOSIS — F40.243 ANXIETY WITH FLYING: Primary | ICD-10-CM

## 2021-01-27 RX ORDER — ALPRAZOLAM 0.5 MG
.5-1 TABLET ORAL 2 TIMES DAILY PRN
Qty: 10 TABLET | Refills: 0 | Status: SHIPPED | OUTPATIENT
Start: 2021-01-27

## 2021-01-27 NOTE — TELEPHONE ENCOUNTER
Reason for Call:  Other prescription    Detailed comments: Gabby is flying out to [place] on 2/9/21 and back on 2/17/21.  She was hoping for some anxiety medication for the flight there and back.  Please review and advise. Thank you..Brooklynn Austin    Phone Number Patient can be reached at: Home number on file [phone] (home)    Best Time: any time    Can we leave a detailed message on this number? YES    Call taken on 1/27/2021 at 1:58 PM by Brooklynn Austin

## 2021-02-28 ENCOUNTER — COMMUNICATION - HEALTHEAST (OUTPATIENT)
Dept: FAMILY MEDICINE | Facility: CLINIC | Age: 71
End: 2021-02-28

## 2021-03-28 ENCOUNTER — HEALTH MAINTENANCE LETTER (OUTPATIENT)
Age: 71
End: 2021-03-28

## 2021-05-26 ENCOUNTER — RECORDS - HEALTHEAST (OUTPATIENT)
Dept: ADMINISTRATIVE | Facility: CLINIC | Age: 71
End: 2021-05-26
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
## 2021-05-29 NOTE — PATIENT INSTRUCTIONS - HE
Max 3000mg tylenol per day (between Tylenol #3 and regular tylenol).   Use Naproxen 500mg twice daily.   May use Tylenol #3 4 times daily.    See orthopedics, see ENT.     Wait on colonoscopy.

## 2021-05-30 VITALS — WEIGHT: 127 LBS | HEIGHT: 68 IN | BODY MASS INDEX: 19.25 KG/M2

## 2021-05-31 NOTE — TELEPHONE ENCOUNTER
Refill Approved    Rx renewed per Medication Renewal Policy. Medication was last renewed on 3/7/19.    Mitzi Walls, [facility] Triage/Med Refill 8/30/2019     Requested Prescriptions   Pending Prescriptions Disp Refills     traZODone (DESYREL) 50 MG tablet [Pharmacy Med Name: TRAZODONE 50MG      TAB] 90 tablet 1     Sig: TAKE 1 TABLET BY MOUTH AT BEDTIME       Tricyclics/Misc Antidepressant/Antianxiety Meds Refill Protocol Passed - 8/30/2019 12:45 PM        Passed - PCP or prescribing provider visit in last year     Last office visit with prescriber/PCP: 1/15/2016 Anuradha Benton MD OR same dept: 6/21/2019 Lisbet Soto DO OR same specialty: 6/21/2019 Lisbet Soto DO  Last physical: 4/23/2018 Last MTM visit: Visit date not found   Next visit within 3 mo: Visit date not found  Next physical within 3 mo: Visit date not found  Prescriber OR PCP: Anuradha Benton MD  Last diagnosis associated with med order: 1. Insomnia  - traZODone (DESYREL) 50 MG tablet [Pharmacy Med Name: TRAZODONE 50MG      TAB]; TAKE 1 TABLET BY MOUTH AT BEDTIME  Dispense: 90 tablet; Refill: 1    If protocol passes may refill for 12 months if within 3 months of last provider visit (or a total of 15 months).

## 2021-06-01 VITALS — WEIGHT: 125.13 LBS | BODY MASS INDEX: 19.64 KG/M2 | HEIGHT: 67 IN

## 2021-06-01 VITALS — WEIGHT: 125.56 LBS | HEIGHT: 68 IN | BODY MASS INDEX: 19.03 KG/M2

## 2021-06-01 NOTE — TELEPHONE ENCOUNTER
RN cannot approve Refill Request    RN can NOT refill this medication med is not covered by policy/route to provider. Last office visit: 6/21/2019 Lisbet Soto DO Last Physical: Visit date not found Last MTM visit: Visit date not found Last visit same specialty: 6/21/2019 Lisbet Soto DO.  Next visit within 3 mo: Visit date not found  Next physical within 3 mo: Visit date not found      Ericka Garcia, [facility] Triage/Med Refill 9/11/2019    Requested Prescriptions   Pending Prescriptions Disp Refills     naproxen (NAPROSYN) 500 MG tablet [Pharmacy Med Name: NAPROXEN 500 MG TABLET] 60 tablet 2     Sig: TAKE 1 TABLET BY MOUTH TWICE A DAY WITH MEALS       There is no refill protocol information for this order

## 2021-06-02 VITALS — HEIGHT: 67 IN | BODY MASS INDEX: 19.65 KG/M2 | WEIGHT: 125.19 LBS

## 2021-06-02 NOTE — TELEPHONE ENCOUNTER
RN cannot approve Refill Request    RN can NOT refill this medication med is not covered by policy/route to provider. Last office visit: 6/21/2019 Lisbet Soto DO Last Physical: Visit date not found Last MTM visit: Visit date not found Last visit same specialty: 6/21/2019 Lisbet Soto DO.  Next visit within 3 mo: Visit date not found  Next physical within 3 mo: Visit date not found      Melanie Alberts, [facility] Triage/Med Refill 10/31/2019    Requested Prescriptions   Pending Prescriptions Disp Refills     naproxen (NAPROSYN) 500 MG tablet [Pharmacy Med Name: NAPROXEN 500 MG TABLET] 60 tablet 2     Sig: TAKE 1 TABLET BY MOUTH TWICE A DAY WITH MEALS       There is no refill protocol information for this order

## 2021-06-03 VITALS — BODY MASS INDEX: 19.31 KG/M2 | HEIGHT: 67 IN | WEIGHT: 123.06 LBS

## 2021-06-04 VITALS
BODY MASS INDEX: 18.58 KG/M2 | WEIGHT: 118.38 LBS | HEIGHT: 67 IN | DIASTOLIC BLOOD PRESSURE: 71 MMHG | RESPIRATION RATE: 16 BRPM | TEMPERATURE: 98.2 F | SYSTOLIC BLOOD PRESSURE: 129 MMHG | HEART RATE: 75 BPM

## 2021-06-04 NOTE — TELEPHONE ENCOUNTER
Refill Approved    Rx renewed per Medication Renewal Policy. Medication was last renewed on 4/23/18.    Nata Alvarado, [facility] Triage/Med Refill 12/27/2019     Requested Prescriptions   Pending Prescriptions Disp Refills     azelastine (OPTIVAR) 0.05 % ophthalmic solution 6 mL 12     Sig: Use one drop to each eye daily       Opthalmic Allergy Drops Refill Protocol Passed - 12/27/2019  4:31 AM        Passed - Patient has had office visit/physical in last 12 months     Last office visit with prescriber/PCP: 1/15/2016 Anuradha Benton MD OR same dept: 6/21/2019 Lisbet Soto DO OR same specialty: 6/21/2019 Lisbet Soto DO  Last physical: 4/23/2018 Last MTM visit: Visit date not found   Next visit within 3 mo: Visit date not found  Next physical within 3 mo: Visit date not found  Prescriber OR PCP: Anuradha Benton MD  Last diagnosis associated with med order: 1. Allergic rhinitis  - azelastine (OPTIVAR) 0.05 % ophthalmic solution; Use one drop to each eye daily  Dispense: 6 mL; Refill: 12    If protocol passes may refill for 12 months if within 3 months of last provider visit (or a total of 15 months).

## 2021-06-07 NOTE — TELEPHONE ENCOUNTER
Patient Returning Call  Reason for call:  Return call   Information relayed to patient:  Caller was informed of message below.   Patient has additional questions:  Yes  If YES, what are your questions/concerns:  Caller stated that she will wait to see how it goes and if she notices herself not getting better she will call to set up the phone visit.   Okay to leave a detailed message?: No call back needed

## 2021-06-07 NOTE — TELEPHONE ENCOUNTER
Refill Approved    Rx renewed per Medication Renewal Policy. Medication was last renewed on 8/30/19.    Mitzi Walls, [facility] Triage/Med Refill 4/17/2020     Requested Prescriptions   Pending Prescriptions Disp Refills     traZODone (DESYREL) 50 MG tablet [Pharmacy Med Name: traZODone HCl 50 MG Oral Tablet] 90 tablet 0     Sig: TAKE 1 TABLET BY MOUTH AT BEDTIME       Tricyclics/Misc Antidepressant/Antianxiety Meds Refill Protocol Passed - 4/16/2020  3:09 PM        Passed - PCP or prescribing provider visit in last year     Last office visit with prescriber/PCP: Visit date not found OR same dept: 6/21/2019 Lisbet Soto DO OR same specialty: 6/21/2019 Lisbet Soto DO  Last physical: 4/23/2018 Last MTM visit: Visit date not found   Next visit within 3 mo: Visit date not found  Next physical within 3 mo: Visit date not found  Prescriber OR PCP: Anuradha Benton MD  Last diagnosis associated with med order: 1. Insomnia  - traZODone (DESYREL) 50 MG tablet [Pharmacy Med Name: traZODone HCl 50 MG Oral Tablet]; TAKE 1 TABLET BY MOUTH AT BEDTIME  Dispense: 90 tablet; Refill: 0    If protocol passes may refill for 12 months if within 3 months of last provider visit (or a total of 15 months).

## 2021-06-10 NOTE — TELEPHONE ENCOUNTER
Please help this patient make a video appointment and route back to me for limited refill if needed    Thanks!    BB

## 2021-06-10 NOTE — PROGRESS NOTES
"  Chief Complaint   Patient presents with     Tailbone Pain     Pt fell down 6 stairs yesterday in basement     Leg Pain     Upper right leg pain         HPI:   Gabby Donis is a 66 y.o. female fell down stairs yesterday.  Tailbone hurts.  Using aleve with little help.  Icing.  Mild pain upper right leg.  No numbness, no weakness.  Had bowel movement this morning.    ROS:  A 12 point comprehensive review of systems was negative except as noted.     Medications:  Current Outpatient Prescriptions on File Prior to Visit   Medication Sig Dispense Refill     albuterol (PROVENTIL HFA;VENTOLIN HFA) 90 mcg/actuation inhaler Inhale 2 puffs every 6 (six) hours as needed for wheezing. 1 Inhaler 4     cyclobenzaprine (FLEXERIL) 5 MG tablet Take 1 tablet (5 mg total) by mouth 3 (three) times a day as needed for muscle spasms. 30 tablet 1     fluticasone (FLONASE) 50 mcg/actuation nasal spray 1 spray into each nostril daily.       tiotropium (SPIRIVA) 18 mcg inhalation capsule Place 1 capsule (2 puffs total) into inhaler and inhale daily. 30 capsule 11     traZODone (DESYREL) 50 MG tablet TAKE ONE TABLET BY MOUTH AT BEDTIME 30 tablet 6     No current facility-administered medications on file prior to visit.          Social History:  Social History   Substance Use Topics     Smoking status: Current Some Day Smoker     Smokeless tobacco: Not on file     Alcohol use Not on file         Physical Exam:   Vitals:    05/10/17 1014   BP: 122/66   Patient Site: Right Arm   Patient Position: Sitting   Cuff Size: Adult Regular   Pulse: 72   Resp: 16   Temp: 97.8  F (36.6  C)   TempSrc: Oral   Weight: 127 lb (57.6 kg)   Height: 5' 8\" (1.727 m)       GEN:  NAD  LUNGS:  Clear to auscultation without wheezing.  Normal effort.  HEART:  RRR without murmur, rub or gallop   BACK:  Tender over coccyx    Assessment/Plan:    1. Injury of coccyx, initial encounter  HYDROcodone-acetaminophen 5-325 mg per tablet   2. Allergic rhinitis  " fluticasone (FLONASE) 50 mcg/actuation nasal spray      Discussed injuries to coccyx involve symptomatic treatment.  Doughnut to sit on.  Frequent icing.  vicoden for severe pain otherwise ibuprofen or naproxen.  Watch for constipation.  Recheck for problems.    She requests refills of current medications unrelated to today's medical evaluation. These medications and related lab tests were reviewed with her and refilled.        The following portions of the patient's history were reviewed and updated as appropriate: allergies, current medications, past family history, past medical history, past social history, past surgical history and problem list.    Jorge Luis Oh MD      5/10/2017

## 2021-06-10 NOTE — TELEPHONE ENCOUNTER
RN cannot approve Refill Request    RN can NOT refill this medication PCP messaged that patient is overdue for Office Visit. Last office visit: Visit date not found Last Physical: 4/23/2018 Last MTM visit: Visit date not found Last visit same specialty: 6/21/2019 Lisbet Soto DO.  Next visit within 3 mo: Visit date not found  Next physical within 3 mo: Visit date not found      [facility] Connection Triage/Med Refill 8/3/2020    Requested Prescriptions   Pending Prescriptions Disp Refills     traZODone (DESYREL) 50 MG tablet [Pharmacy Med Name: traZODone HCl 50 MG Oral Tablet] 90 tablet 0     Sig: TAKE 1 TABLET BY MOUTH AT BEDTIME       Tricyclics/Misc Antidepressant/Antianxiety Meds Refill Protocol Failed - 8/3/2020  1:14 PM        Failed - PCP or prescribing provider visit in last year     Last office visit with prescriber/PCP: Visit date not found OR same dept: Visit date not found OR same specialty: 6/21/2019 Lisbet Soto DO  Last physical: 4/23/2018 Last MTM visit: Visit date not found   Next visit within 3 mo: Visit date not found  Next physical within 3 mo: Visit date not found  Prescriber OR PCP: Anuradha Benton MD  Last diagnosis associated with med order: 1. Insomnia  - traZODone (DESYREL) 50 MG tablet [Pharmacy Med Name: traZODone HCl 50 MG Oral Tablet]; TAKE 1 TABLET BY MOUTH AT BEDTIME  Dispense: 90 tablet; Refill: 0    If protocol passes may refill for 12 months if within 3 months of last provider visit (or a total of 15 months).
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
## 2021-06-12 NOTE — TELEPHONE ENCOUNTER
Refill Approved    Rx renewed per Medication Renewal Policy. Medication was last renewed on 8/4/20.    Mitzi Walls, [facility] Connection Triage/Med Refill 11/3/2020     Requested Prescriptions   Pending Prescriptions Disp Refills     traZODone (DESYREL) 50 MG tablet 90 tablet 0     Sig: Take 1 tablet (50 mg total) by mouth at bedtime.       Tricyclics/Misc Antidepressant/Antianxiety Meds Refill Protocol Passed - 10/30/2020 12:21 PM        Passed - PCP or prescribing provider visit in last year     Last office visit with prescriber/PCP: Visit date not found OR same dept: Visit date not found OR same specialty: 6/21/2019 Lisbet Soto DO  Last physical: 9/9/2020 Last MTM visit: Visit date not found   Next visit within 3 mo: Visit date not found  Next physical within 3 mo: Visit date not found  Prescriber OR PCP: Anuradha Benton MD  Last diagnosis associated with med order: 1. Insomnia  - traZODone (DESYREL) 50 MG tablet; Take 1 tablet (50 mg total) by mouth at bedtime.  Dispense: 90 tablet; Refill: 0    If protocol passes may refill for 12 months if within 3 months of last provider visit (or a total of 15 months).

## 2021-06-14 NOTE — TELEPHONE ENCOUNTER
Controlled Substance Refill Request  Medication Name:   Requested Prescriptions     Pending Prescriptions Disp Refills     ALPRAZolam (XANAX) 0.25 MG tablet 10 tablet 0     Sig: Take 1 tablet (0.25 mg total) by mouth 3 (three) times a day as needed for anxiety.     Date Last Fill: 3/19/19  Requested Pharmacy: [facility]  Submit electronically to pharmacy  Controlled Substance Agreement on file:   Encounter-Level CSA Scan Date:    There are no encounter-level csa scan date.        Last office visit:  9/9/20

## 2021-06-14 NOTE — TELEPHONE ENCOUNTER
LMTCKAELA. When pt returns call please let her know that Dr Benton is now at the [facility]. She will need to request this medication with her there or she will need to establish care with a new provider here. If pt wishes to continue care with Dr Benton she can call [phone] to request the medication from her

## 2021-06-14 NOTE — TELEPHONE ENCOUNTER
Reason for Call:  Medication or medication refill:    Do you use a [facility]?  Name of the pharmacy and phone number for the current request: [facility] [phone]    Name of the medication requested: ALPRAZolam (XANAX) 0.25 MG tablet    Other request: Pt would like RX for plane travel on 2/9/21. Please send for plane to and from destination    Can we leave a detailed message on this number? No call back needed    Phone number patient can be reached at:   Cell number on file:    Telephone Information:   Mobile [phone]       Best Time:      Call taken on 1/27/2021 at 12:46 PM by Sarah Carrero

## 2021-06-17 NOTE — PROGRESS NOTES
Assessment and Plan:   Gabby Reynolds is a very pleasant 67-year-old female presenting for her annual wellness visit.    1. Screen for colon cancer  - Ambulatory referral for Colonoscopy    2. Visit for screening mammogram  - Mammo Screening Bilateral; Future    3. Screening for diabetes mellitus  - Glucose    4. Screening for lipid disorders  - Lipid [place] FASTING    5. History of anemia  - Hemoglobin    6. Screening for cervical cancer  In 2014 she had a Pap smear which was ASCUS with positive low risk HPV.  - Gynecologic Cytology (PAP Smear)    7. Situational anxiety  Refill of Xanax for her upcoming cruise  - ALPRAZolam (XANAX) 0.25 MG tablet; Take 1 tablet (0.25 mg total) by mouth 3 (three) times a day as needed for anxiety.  Dispense: 10 tablet; Refill: 0    8. Allergic rhinitis  I did discuss referral to the allergist.  In the meantime we will send Nasonex and is a lasting eyedrops to the pharmacy.  She will let me know if she would like to see the allergist.  - mometasone (NASONEX) 50 mcg/actuation nasal spray; 2 sprays into each nostril daily.  Dispense: 17 g; Refill: 2  - azelastine (OPTIVAR) 0.05 % ophthalmic solution; Use one drop to each eye daily  Dispense: 6 mL; Refill: 12    9. Routine general medical examination at a health care facility    The patient's current medical problems were reviewed.    I have had an Advance Directives discussion with the patient.  The following health maintenance schedule was reviewed with the patient and provided in printed form in the after visit summary:   Health Maintenance   Topic Date Due     ADVANCE DIRECTIVES DISCUSSED WITH PATIENT  12/12/1966     ZOSTER VACCINE  12/12/2008     MAMMOGRAM  08/07/2009     DXA SCAN  12/12/2013     PNEUMOCOCCAL POLYSACCHARIDE VACCINE AGE 65 AND OVER  12/12/2013     PNEUMOCOCCAL CONJUGATE VACCINE FOR ADULTS (PCV13 OR PREVNAR)  12/12/2013     INFLUENZA VACCINE RULE BASED (1) 08/01/2017     COLONOSCOPY  09/11/2017     FALL RISK  "ASSESSMENT  04/05/2019     TD 18+ HE  05/08/2020        Subjective:   Chief Complaint: Gabby Donis is an 67 y.o. female here for an Annual Wellness visit.   HPI:  Gabby Reynolds is a very pleasant 67-year-old female presenting to the clinic today for an annual wellness visit.  She really has no questions or concerns today.  She recently took a cruise with her sisters and daughter and will be going on another cruise with 1 of her coworkers which she has one.  She is very excited about this.  She has been trying to stay physically active.    She continues to work as a .  She enjoys her work.    She has been having some issues with allergic rhinitis.  She is currently using Flonase but it does not seem to be really helping.  She notes that her nose continually \"drips\" and her eyes feel itchy.  She feels as though it is somewhat worse in the spring but happens all year round.    Review of Systems:   Please see above.  The rest of the review of systems are negative for all systems.    Patient Care Team:  Anuradha Benton MD as PCP - General     Patient Active Problem List   Diagnosis     Hyperlipidemia     Past Medical History:   Diagnosis Date     Allergic rhinitis       Past Surgical History:   Procedure Laterality Date     APPENDECTOMY       CHOLECYSTECTOMY        Family History   Problem Relation Age of Onset     No Medical Problems Mother       Social History     Social History     Marital status:      Spouse name: N/A     Number of children: N/A     Years of education: N/A     Occupational History     Not on file.     Social History Main Topics     Smoking status: Current Some Day Smoker     Smokeless tobacco: Never Used     Alcohol use Not on file     Drug use: Not on file     Sexual activity: Not on file     Other Topics Concern     Not on file     Social History Narrative      Current Outpatient Prescriptions   Medication Sig Dispense Refill     albuterol (PROVENTIL HFA;VENTOLIN HFA) 90 " "mcg/actuation inhaler Inhale 2 puffs every 6 (six) hours as needed for wheezing. 1 Inhaler 4     ALPRAZolam (XANAX) 0.25 MG tablet Take 1 tablet (0.25 mg total) by mouth 3 (three) times a day as needed for anxiety. 10 tablet 0     cyclobenzaprine (FLEXERIL) 5 MG tablet Take 1 tablet (5 mg total) by mouth 3 (three) times a day as needed for muscle spasms. 30 tablet 1     HYDROcodone-acetaminophen 5-325 mg per tablet Take 1-2 tablets by mouth every 6 (six) hours as needed for pain. 12 tablet 0     scopolamine (TRANSDERM-SCOP) 1.5 mg transdermal patch Place 1 patch on the skin every third day. 4 patch 1     tiotropium (SPIRIVA) 18 mcg inhalation capsule Place 1 capsule (2 puffs total) into inhaler and inhale daily. 30 capsule 11     traZODone (DESYREL) 50 MG tablet TAKE ONE TABLET BY MOUTH AT BEDTIME 30 tablet 2     azelastine (OPTIVAR) 0.05 % ophthalmic solution Use one drop to each eye daily 6 mL 12     mometasone (NASONEX) 50 mcg/actuation nasal spray 2 sprays into each nostril daily. 17 g 2     No current facility-administered medications for this visit.       Objective:   Vital Signs:   Visit Vitals     /62     Pulse 72     Temp 97.8  F (36.6  C) (Oral)     Resp 16     Ht 5' 6.5\" (1.689 m)     Wt 125 lb 2 oz (56.8 kg)     Breastfeeding No     BMI 19.89 kg/m2        VisionScreening:  No exam data present     PHYSICAL EXAM    Physical Exam:  General Appearance: Alert, cooperative, no distress, appears stated age   Head: Normocephalic, without obvious abnormality, atraumatic  Eyes: PERRL, conjunctiva/corneas clear, EOM's intact   Ears: Normal TM's and external ear canals, both ears  Nose:Nares normal, septum midline,mucosa normal, no drainage    Throat:Lips, mucosa, and tongue normal; teeth and gums normal  Neck: Supple, symmetrical, trachea midline, no adenopathy;  thyroid: not enlarged, symmetric, no tenderness/mass/nodules  Back: Symmetric, no curvature, ROM normal,  Lungs: Clear to auscultation bilaterally, " respirations unlabored  Breasts: No breast masses, tenderness, asymmetry, or nipple discharge.  Heart: Regular rate and rhythm, S1 and S2 normal, no murmur, rub, or gallop  Abdomen: Soft, non-tender, bowel sounds active all four quadrants,  no masses, no organomegaly  Pelvic:normal external female genitalia, normal appearing vaginal mucosa and cervix  Extremities: Extremities normal, atraumatic, no cyanosis or edema  Skin: Skin color, texture, turgor normal, no rashes or lesions  Lymph nodes: Cervical, supraclavicular, and axillary nodes normal and   Neurologic: Normal      Assessment Results 4/23/2018   Activities of Daily Living No help needed   Instrumental Activities of Daily Living No help needed   Get Up and Go Score Less than 12 seconds   Mini Cog Total Score 3   Some recent data might be hidden     A Mini-Cog score of 0-2 suggests the possibility of dementia, score of 3-5 suggests no dementia    Identified Health Risks:     She is at risk for falling and has been provided with information to reduce the risk of falling at home.  Patient's advanced directive was discussed and I am comfortable with the patient's wishes.

## 2021-06-17 NOTE — PROGRESS NOTES
Chief Complaint   Patient presents with     Abdominal Cramping         HPI:   Gabby Donis is a 67 y.o. female c/o abdominal cramping for the last week.  Mild nausea.  No vomiting.  No fever.  Looser stools--up to four times a day.  No dysuria.  No hematuria.  Slight cough. No shortness of breath   No chest pain.  No stuffy nose.  No sore throat.  Eating and drinking fluids.  No one else at home is sick.  On Cruise in January. Not sick then.  No travel since then.    Plans trip in May.  Gets anxious flying.  Requests medication.    Also requests refill of flexeril          ROS:  A 10 point comprehensive review of systems was negative except as noted.     Medications:  Current Outpatient Prescriptions on File Prior to Visit   Medication Sig Dispense Refill     traZODone (DESYREL) 50 MG tablet TAKE ONE TABLET BY MOUTH AT BEDTIME 30 tablet 2     albuterol (PROVENTIL HFA;VENTOLIN HFA) 90 mcg/actuation inhaler Inhale 2 puffs every 6 (six) hours as needed for wheezing. 1 Inhaler 4     cyclobenzaprine (FLEXERIL) 5 MG tablet Take 1 tablet (5 mg total) by mouth 3 (three) times a day as needed for muscle spasms. 30 tablet 1     fluticasone (FLONASE) 50 mcg/actuation nasal spray Two sprays each nostrils once daily 16 g 11     HYDROcodone-acetaminophen 5-325 mg per tablet Take 1-2 tablets by mouth every 6 (six) hours as needed for pain. 12 tablet 0     scopolamine (TRANSDERM-SCOP) 1.5 mg transdermal patch Place 1 patch on the skin every third day. 4 patch 1     tiotropium (SPIRIVA) 18 mcg inhalation capsule Place 1 capsule (2 puffs total) into inhaler and inhale daily. 30 capsule 11     No current facility-administered medications on file prior to visit.          Social History:  Social History   Substance Use Topics     Smoking status: Current Some Day Smoker     Smokeless tobacco: Never Used     Alcohol use Not on file         Physical Exam:   Vitals:    04/05/18 1202   BP: 118/62   Pulse: 72   Resp: 12   Temp: 97.9  " F (36.6  C)   TempSrc: Oral   Weight: 125 lb 9 oz (57 kg)   Height: 5' 8\" (1.727 m)       GENERAL:   Alert. Oriented.  EYES: Clear  HENT:  Ears: R TM pearly gray. Normal landmarks. L TM pearly gray.  Normal landmarks  Nose: Clear.  Sinuses: Nontender.  Oropharynx:  No erythema. No exudate.  NECK: Supple. No adenopathy.  LUNGS: Clear to ascultation.  No crackles.  No wheezing  HEART: RRR  SKIN:  No rash.   ABDOMEN:  +BS. No tenderness. Soft, no guarding, rebound, rigidity,mass, or organomegaly. No CVA tenderness          Assessment/Plan:    1. Diarrhea  Appears well hydrated.  Symptoms improving.  Likely viral.  May use imodium or peptobismal  Recheck for problems    2. Situational anxiety  Xanax for flight    3. Muscle spasm  Refilled flexeril.           Jorge Luis Oh MD      4/5/2018        "

## 2021-06-18 NOTE — PATIENT INSTRUCTIONS - HE
Patient Instructions by Anuradha Benton MD at 9/9/2020  2:20 PM     Author: Anuradha Benton MD Service: -- Author Type: Physician    Filed: 9/9/2020  2:44 PM Encounter Date: 9/9/2020 Status: Signed    : Anuradha Benton MD (Physician)         Patient Education   Preventing Falls in the Home  As you get older, falls are more likely. Thats because your reaction time slows. Your muscles and joints may also get stiffer, making them less flexible. Illness, medications, and vision changes can also affect your balance. A fall could leave you unable to live on your own. To make your home safer, follow these tips:    Floors    Put nonskid pads under area rugs.    Remove throw rugs.    Replace worn floor coverings.    Tack carpets firmly to each step on carpeted stairs. Put nonskid strips on the edges of uncarpeted stairs.    Keep floors and stairs free of clutter and cords.    Arrange furniture so there are clear pathways.    Clean up any spills right away.    Bathrooms    Install grab bars in the tub or shower.    Apply nonskid strips or put a nonskid rubber mat in the tub or shower.    Sit on a bath chair to bathe.    Use bathmats with nonskid backing.    Lighting    Keep a flashlight in each room.    Put a nightlight along the pathway between the bedroom and the bathroom.    6967-5399 [employer]. [address] [place] [address]. All rights reserved. This information is not intended as a substitute for professional medical care. Always follow your healthcare professional's instructions.           Advance Directive  Patients advance directive was discussed and I am comfortable with the patients wishes.  Patient Education   Personalized Prevention Plan  You are due for the preventive services outlined below.  Your care team is available to assist you in scheduling these services.  If you have already completed any of these items, please share that information with your  care team to update in your medical record.  Health Maintenance   Topic Date Due   ? HEPATITIS C SCREENING  1950   ? ZOSTER VACCINES (1 of 2) 12/12/1998   ? PNEUMOCOCCAL IMMUNIZATION 65+ LOW/MEDIUM RISK (1 of 2 - PCV13) 12/12/2013   ? DXA SCAN  12/12/2013   ? MEDICARE ANNUAL WELLNESS VISIT  04/23/2019   ? MAMMOGRAM  06/04/2020   ? FALL RISK ASSESSMENT  06/21/2020   ? INFLUENZA VACCINE RULE BASED (1) 08/01/2020   ? COLORECTAL CANCER SCREENING  10/09/2020 (Originally 1950)   ? LIPID  04/23/2023   ? ADVANCE CARE PLANNING  04/23/2023   ? TD 18+ HE  06/19/2029   ? HEPATITIS B VACCINES  Aged Out

## 2021-06-20 NOTE — PROGRESS NOTES
"[facility] Note    Patient Name: Gabby Donis  Patient Age: 67 y.o.  YOB: 1950  MRN: [id number]    Date of visit: 9/25/2018    Patient Active Problem List   Diagnosis     Hyperlipidemia     Social History     Social History Narrative     Family History   Problem Relation Age of Onset     No Medical Problems Mother      Outpatient Encounter Prescriptions as of 9/25/2018   Medication Sig Dispense Refill     azelastine (OPTIVAR) 0.05 % ophthalmic solution Use one drop to each eye daily 6 mL 12     cyclobenzaprine (FLEXERIL) 5 MG tablet Take 1 tablet (5 mg total) by mouth 3 (three) times a day as needed for muscle spasms. 30 tablet 1     mometasone (NASONEX) 50 mcg/actuation nasal spray 2 sprays into each nostril daily. 17 g 2     traZODone (DESYREL) 50 MG tablet TAKE ONE TABLET BY MOUTH AT BEDTIME 90 tablet 3     albuterol (PROVENTIL HFA;VENTOLIN HFA) 90 mcg/actuation inhaler Inhale 2 puffs every 6 (six) hours as needed for wheezing. 1 Inhaler 4     ALPRAZolam (XANAX) 0.25 MG tablet Take 1 tablet (0.25 mg total) by mouth 3 (three) times a day as needed for anxiety. 10 tablet 0     HYDROcodone-acetaminophen 5-325 mg per tablet Take 1-2 tablets by mouth every 6 (six) hours as needed for pain. 12 tablet 0     scopolamine (TRANSDERM-SCOP) 1.5 mg transdermal patch Place 1 patch on the skin every third day. 4 patch 1     tiotropium (SPIRIVA) 18 mcg inhalation capsule Place 1 capsule (2 puffs total) into inhaler and inhale daily. 30 capsule 11     Facility-Administered Encounter Medications as of 9/25/2018   Medication Dose Route Frequency Provider Last Rate Last Dose     ketorolac injection 30 mg (TORADOL)  30 mg Intramuscular Once Curtis Kim MD           Chief Complaint:   Chief Complaint   Patient presents with     Back Pain     Right side       /76  Pulse 68  Temp 98.1  F (36.7  C) (Oral)   Resp 20  Ht 5' 6.5\" (1.689 m)  Wt 125 lb 3 oz (56.8 kg)  BMI 19.9 kg/m2  HPI: " "  Right flank pain since yesterday.     Pain location:right flank  Intensity:intense  Duration:yest  How did it start:?  Quality:stabbing  Radiation:none  Constant or intermittent:constant  Worsening:y  Improving:n  Makes worse:?  Makes better:?    No hx of this or renal calculi.      Fever:no  Urinary Incontinence or inability to urinate: no          Decreased sensation perianal or other part of body:no  Hematuria, dysuria or other urinary symptoms:no  History of renal calculus:no  Abdominal or chest pain: no  Weakness:no      History of back surgery:no  History of cancer or tuberculosis:no  History of immunocompromise: no  IV drug use: no  Alcohol abuse: no  History of vascular disease or known AAA: no  On anticoagulants:no        ROS: Pertinent ros findings in hpi, all other systems negative.    Objective/Physical Exam:     /76  Pulse 68  Temp 98.1  F (36.7  C) (Oral)   Resp 20  Ht 5' 6.5\" (1.689 m)  Wt 125 lb 3 oz (56.8 kg)  BMI 19.9 kg/m2    Gen: NAD, appears age  Skin: warm, dry  HENT: normocephalic atraumatic, MMM  Eyes: non-icteric, no proptosis  CV: NRRR no m/r/g, no peripheral edema  Resp: CTAB no w/r/r, normal respiratory effort  Abd: non-distended, soft  Hematologic: No petechiae or purpura  MSK: no muscle or joint swelling  Neuro: no dysarthria or gross asymmetry  Psych: Cooperative, full affect    Right flank ttp. abd soft, nttp.    Assessment/Plan:  No results found for this or any previous visit (from the past 24 hour(s)).  Medications Ordered   Medications     ketorolac injection 30 mg (TORADOL)       ICD-10-CM    1. Acute right flank pain R10.9 Comprehensive Metabolic Panel     HM1(CBC and Differential)     Urinalysis-UC if Indicated     Lipase     XR Chest 2 Views     ketorolac injection 30 mg (TORADOL)     HM1 (CBC with Diff)       Clinically, this seems like a kidney stone.  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  I am recommending CT of the abdomen, she states that " she is unable to get this done today, but will have transportation later on the evening.  I offered to either do the CT scan tomorrow morning or she could go to the emergency room tonight, she prefers to go to the emergency room tonight.  We will do a chest x-ray and basic lab work now.  I did give her a shot of Toradol.    Patient Instructions   Go to ER      Counseled patient regarding treatments, treatment options, risks and benefits and diagnosis.  The patient was interactive, attentive, verbalized understanding, and we discussed plan.     Curtis Kim MD

## 2021-06-24 NOTE — TELEPHONE ENCOUNTER
Refill Approved    Rx renewed per Medication Renewal Policy. Medication was last renewed on 4/26/18.    Last office visit 9/25/18    Juan Francisco Live, [facility] Triage/Med Refill 3/7/2019     Requested Prescriptions   Pending Prescriptions Disp Refills     traZODone (DESYREL) 50 MG tablet [Pharmacy Med Name: TRAZODONE 50MG      TAB] 90 tablet 3     Sig: TAKE 1 TABLET BY MOUTH AT BEDTIME    Tricyclics/Misc Antidepressant/Antianxiety Meds Refill Protocol Passed - 3/4/2019  4:57 PM       Passed - PCP or prescribing provider visit in last year    Last office visit with prescriber/PCP: 1/15/2016 Anuradha Benton MD OR same dept: 9/25/2018 Curtis Kim MD OR same specialty: 9/25/2018 Curtis Kim MD  Last physical: 4/23/2018 Last MTM visit: Visit date not found   Next visit within 3 mo: Visit date not found  Next physical within 3 mo: Visit date not found  Prescriber OR PCP: nAuradha Benton MD  Last diagnosis associated with med order: 1. Insomnia  - traZODone (DESYREL) 50 MG tablet [Pharmacy Med Name: TRAZODONE 50MG      TAB]; TAKE 1 TABLET BY MOUTH AT BEDTIME  Dispense: 90 tablet; Refill: 3    If protocol passes may refill for 12 months if within 3 months of last provider visit (or a total of 15 months).

## 2021-06-25 NOTE — TELEPHONE ENCOUNTER
Controlled Substance Refill Request  Medication Name:   Requested Prescriptions     Pending Prescriptions Disp Refills     ALPRAZolam (XANAX) 0.25 MG tablet 10 tablet 0     Sig: Take 1 tablet (0.25 mg total) by mouth 3 (three) times a day as needed for anxiety.     Date Last Fill: 4/23/2018  Pharmacy: Changing pharmacy to [facility]      Submit electronically to pharmacy  Controlled Substance Agreement Date Scanned:   Encounter-Level CSA Scan Date:    There are no encounter-level csa scan date.       Last office visit with prescriber/PCP: 1/15/2016 Anuradha Benton MD OR same dept: 9/25/2018 Curtis Kim MD OR same specialty: 9/25/2018 Curtis Kim MD  Last physical: 4/23/2018 Last MTM visit: Visit date not found      Patient is needing this for flying, as she is going on vacation.

## 2021-07-21 ENCOUNTER — RECORDS - HEALTHEAST (OUTPATIENT)
Dept: ADMINISTRATIVE | Facility: CLINIC | Age: 71
End: 2021-07-21

## 2021-09-10 PROBLEM — E78.5 HYPERLIPIDEMIA: Status: ACTIVE | Noted: 2021-09-10

## 2021-09-10 RX ORDER — FLUTICASONE PROPIONATE 50 MCG
SPRAY, SUSPENSION (ML) NASAL
COMMUNITY
Start: 2020-09-11 | End: 2023-11-13

## 2021-09-12 ENCOUNTER — HEALTH MAINTENANCE LETTER (OUTPATIENT)
Age: 71
End: 2021-09-12

## 2021-09-13 ENCOUNTER — OFFICE VISIT (OUTPATIENT)
Dept: FAMILY MEDICINE | Facility: CLINIC | Age: 71
End: 2021-09-13
Payer: COMMERCIAL

## 2021-09-13 VITALS
TEMPERATURE: 97.8 F | RESPIRATION RATE: 16 BRPM | SYSTOLIC BLOOD PRESSURE: 130 MMHG | BODY MASS INDEX: 18.56 KG/M2 | HEART RATE: 76 BPM | DIASTOLIC BLOOD PRESSURE: 84 MMHG | HEIGHT: 66 IN | WEIGHT: 115.5 LBS

## 2021-09-13 DIAGNOSIS — Z78.0 POSTMENOPAUSAL: ICD-10-CM

## 2021-09-13 DIAGNOSIS — K29.70 GASTRITIS WITHOUT BLEEDING, UNSPECIFIED CHRONICITY, UNSPECIFIED GASTRITIS TYPE: ICD-10-CM

## 2021-09-13 DIAGNOSIS — G47.00 INSOMNIA, UNSPECIFIED TYPE: ICD-10-CM

## 2021-09-13 DIAGNOSIS — J44.9 CHRONIC OBSTRUCTIVE PULMONARY DISEASE, UNSPECIFIED COPD TYPE (H): ICD-10-CM

## 2021-09-13 DIAGNOSIS — Z13.1 ENCOUNTER FOR SCREENING FOR DIABETES MELLITUS: ICD-10-CM

## 2021-09-13 DIAGNOSIS — Z13.220 ENCOUNTER FOR SCREENING FOR LIPOID DISORDERS: ICD-10-CM

## 2021-09-13 DIAGNOSIS — Z12.31 ENCOUNTER FOR SCREENING MAMMOGRAM FOR MALIGNANT NEOPLASM OF BREAST: ICD-10-CM

## 2021-09-13 DIAGNOSIS — G25.81 RESTLESS LEG SYNDROME: ICD-10-CM

## 2021-09-13 DIAGNOSIS — Z00.00 ENCOUNTER FOR MEDICARE ANNUAL WELLNESS EXAM: Primary | ICD-10-CM

## 2021-09-13 DIAGNOSIS — D68.51 FACTOR V LEIDEN MUTATION (H): ICD-10-CM

## 2021-09-13 LAB
ANION GAP SERPL CALCULATED.3IONS-SCNC: 5 MMOL/L (ref 3–14)
BUN SERPL-MCNC: 13 MG/DL (ref 7–30)
CALCIUM SERPL-MCNC: 8.4 MG/DL (ref 8.5–10.1)
CHLORIDE BLD-SCNC: 108 MMOL/L (ref 94–109)
CHOLEST SERPL-MCNC: 200 MG/DL
CO2 SERPL-SCNC: 27 MMOL/L (ref 20–32)
CREAT SERPL-MCNC: 0.67 MG/DL (ref 0.52–1.04)
D DIMER PPP FEU-MCNC: 0.48 UG/ML FEU (ref 0–0.5)
FASTING STATUS PATIENT QL REPORTED: YES
GFR SERPL CREATININE-BSD FRML MDRD: 88 ML/MIN/1.73M2
GLUCOSE BLD-MCNC: 83 MG/DL (ref 70–99)
HDLC SERPL-MCNC: 93 MG/DL
LDLC SERPL CALC-MCNC: 96 MG/DL
NONHDLC SERPL-MCNC: 107 MG/DL
POTASSIUM BLD-SCNC: 4.4 MMOL/L (ref 3.4–5.3)
SODIUM SERPL-SCNC: 140 MMOL/L (ref 133–144)
TRIGL SERPL-MCNC: 53 MG/DL

## 2021-09-13 PROCEDURE — 80061 LIPID PANEL: CPT | Performed by: FAMILY MEDICINE

## 2021-09-13 PROCEDURE — 99397 PER PM REEVAL EST PAT 65+ YR: CPT | Performed by: FAMILY MEDICINE

## 2021-09-13 PROCEDURE — 80048 BASIC METABOLIC PNL TOTAL CA: CPT | Performed by: FAMILY MEDICINE

## 2021-09-13 PROCEDURE — 36415 COLL VENOUS BLD VENIPUNCTURE: CPT | Performed by: FAMILY MEDICINE

## 2021-09-13 PROCEDURE — 85379 FIBRIN DEGRADATION QUANT: CPT | Performed by: FAMILY MEDICINE

## 2021-09-13 RX ORDER — TRAZODONE HYDROCHLORIDE 100 MG/1
100 TABLET ORAL AT BEDTIME
Qty: 90 TABLET | Refills: 3 | Status: SHIPPED | OUTPATIENT
Start: 2021-09-13 | End: 2022-09-01

## 2021-09-13 RX ORDER — PRAMIPEXOLE DIHYDROCHLORIDE 0.25 MG/1
0.25 TABLET ORAL AT BEDTIME
Qty: 90 TABLET | Refills: 1 | Status: SHIPPED | OUTPATIENT
Start: 2021-09-13 | End: 2022-03-23

## 2021-09-13 RX ORDER — TRAZODONE HYDROCHLORIDE 50 MG/1
50 TABLET, FILM COATED ORAL AT BEDTIME
Refills: 1 | Status: CANCELLED | OUTPATIENT
Start: 2021-09-13

## 2021-09-13 ASSESSMENT — ENCOUNTER SYMPTOMS
DIARRHEA: 0
HEADACHES: 0
CONSTIPATION: 0
FREQUENCY: 0
SORE THROAT: 0
CHILLS: 0
BREAST MASS: 0
WEAKNESS: 0
DIZZINESS: 0
DYSURIA: 0
NAUSEA: 0
FEVER: 0
NERVOUS/ANXIOUS: 0
EYE PAIN: 0
JOINT SWELLING: 0
COUGH: 0
ARTHRALGIAS: 0
HEARTBURN: 0
SHORTNESS OF BREATH: 0
MYALGIAS: 1
ABDOMINAL PAIN: 0
HEMATURIA: 0
PARESTHESIAS: 0
PALPITATIONS: 0
HEMATOCHEZIA: 0

## 2021-09-13 ASSESSMENT — ACTIVITIES OF DAILY LIVING (ADL): CURRENT_FUNCTION: NO ASSISTANCE NEEDED

## 2021-09-13 ASSESSMENT — MIFFLIN-ST. JEOR: SCORE: 1054.62

## 2021-09-13 NOTE — PROGRESS NOTES
"SUBJECTIVE:   Gabby Donis is a 70 year old female who presents for Preventive Visit.    Patient has been advised of split billing requirements and indicates understanding: Yes   Are you in the first 12 months of your Medicare coverage?  No    Healthy Habits:     In general, how would you rate your overall health?  Good    Frequency of exercise:  4-5 days/week    Duration of exercise:  30-45 minutes    Do you usually eat at least 4 servings of fruit and vegetables a day, include whole grains    & fiber and avoid regularly eating high fat or \"junk\" foods?  Yes    Taking medications regularly:  Yes    Medication side effects:  None    Ability to successfully perform activities of daily living:  No assistance needed    Home Safety:  No safety concerns identified    Hearing Impairment:  No hearing concerns    In the past 6 months, have you been bothered by leaking of urine? Yes    In general, how would you rate your overall mental or emotional health?  Good      PHQ-2 Total Score: 0    Additional concerns today:  No    Do you feel safe in your environment? Yes    Have you ever done Advance Care Planning? (For example, a Health Directive, POLST, or a discussion with a medical provider or your loved ones about your wishes): No, advance care planning information given to patient to review.  Patient plans to discuss their wishes with loved ones or provider.      Fall risk  Fallen 2 or more times in the past year?: No  Any fall with injury in the past year?: No  Cognitive Screening   1) Repeat 3 items (Leader, Season, Table)    2) Clock draw: NORMAL  3) 3 item recall: Recalls 3 objects  Results: 3 items recalled: COGNITIVE IMPAIRMENT LESS LIKELY    Mini-CogTM Copyright [employer]. Licensed by the author for use in [facility]; reprinted with permission (jhony@.[email or web address]). All rights reserved.      Do you have sleep apnea, excessive snoring or daytime drowsiness?: no    Reviewed and updated as needed this visit " "by clinical staff  Tobacco  Allergies  Meds              Reviewed and updated as needed this visit by Provider                Social History     Tobacco Use     Smoking status: Current Some Day Smoker     Types: Cigarettes     Smokeless tobacco: Never Used     Tobacco comment: less than 1/2 pack a week; X 40 years   Substance Use Topics     Alcohol use: Not on file     Comment: very rare         Alcohol Use 9/13/2021   Prescreen: >3 drinks/day or >7 drinks/week? No           Current providers sharing in care for this patient include:  Patient Care Team:  No Ref-Primary, Physician as PCP - Anuradha Mcdowell MD as Assigned PCP    The following health maintenance items are reviewed in Epic and correct as of today:  Health Maintenance Due   Topic Date Due     DEXA  Never done     SPIROMETRY  Never done     ANNUAL REVIEW OF HM ORDERS  Never done     COPD ACTION PLAN  Never done     COVID-19 Vaccine (1) Never done     HEPATITIS C SCREENING  Never done     ZOSTER IMMUNIZATION (1 of 2) Never done     COLORECTAL CANCER SCREENING  09/11/2017     MAMMO SCREENING  06/04/2020     Pneumococcal Vaccine: 65+ Years (1 of 2 - PPSV23) 11/04/2020     INFLUENZA VACCINE (1) 09/01/2021     FALL RISK ASSESSMENT  09/09/2021         Review of Systems  Constitutional, HEENT, cardiovascular, pulmonary, GI, , musculoskeletal, neuro, skin, endocrine and psych systems are negative, except as otherwise noted.    OBJECTIVE:   /84   Pulse 76   Temp 97.8  F (36.6  C) (Tympanic)   Resp 16   Ht 1.683 m (5' 6.25\")   Wt 52.4 kg (115 lb 8 oz)   Breastfeeding No   BMI 18.50 kg/m   Estimated body mass index is 18.5 kg/m  as calculated from the following:    Height as of this encounter: 1.683 m (5' 6.25\").    Weight as of this encounter: 52.4 kg (115 lb 8 oz).  Physical Exam  GENERAL: healthy, alert and no distress  EYES: Eyes grossly normal to inspection, PERRL and conjunctivae and sclerae normal  HENT: ear canals and TM's " normal, nose and mouth without ulcers or lesions  NECK: no adenopathy, no asymmetry, masses, or scars and thyroid normal to palpation  RESP: lungs clear to auscultation - no rales, rhonchi or wheezes  BREAST: normal without masses, tenderness or nipple discharge and no palpable axillary masses or adenopathy  CV: regular rate and rhythm, normal S1 S2, no S3 or S4, no murmur, click or rub, no peripheral edema and peripheral pulses strong  ABDOMEN: soft, nontender, no hepatosplenomegaly, no masses and bowel sounds normal  MS: no gross musculoskeletal defects noted, no edema  SKIN: no suspicious lesions or rashes  NEURO: Normal strength and tone, mentation intact and speech normal  PSYCH: mentation appears normal, affect normal/bright    Diagnostic Test Results:  Labs reviewed in Epic    ASSESSMENT / PLAN:       ICD-10-CM    1. Encounter for Medicare annual wellness exam  Z00.00    2. Encounter for screening mammogram for malignant neoplasm of breast  Z12.31 *MA Screening Digital Bilateral   3. Chronic obstructive pulmonary disease, unspecified COPD type (H)  J44.9    4. Encounter for screening for diabetes mellitus  Z13.1 Basic metabolic panel  (Ca, Cl, CO2, Creat, Gluc, K, Na, BUN)     Basic metabolic panel  (Ca, Cl, CO2, Creat, Gluc, K, Na, BUN)   5. Encounter for screening for lipoid disorders  Z13.220 Lipid panel reflex to direct LDL Fasting     Lipid panel reflex to direct LDL Fasting   6. Postmenopausal  Z78.0 DX Hip/Pelvis/Spine   7. Insomnia, unspecified type  G47.00 traZODone (DESYREL) 100 MG tablet   8. Gastritis without bleeding, unspecified chronicity, unspecified gastritis type  K29.70 omeprazole (PRILOSEC) 20 MG DR capsule   9. Restless leg syndrome  G25.81 pramipexole (MIRAPEX) 0.25 MG tablet   10. Factor V Leiden mutation (H)  D68.51 D dimer, quantitative     D dimer, quantitative   Recommended colon cancer screening.  Patient will consider.    She is working as a  at a [employer].  Encouraged  "her to get her COVID-19 vaccine.    Patient has been advised of split billing requirements and indicates understanding: Yes  COUNSELING:  Reviewed preventive health counseling, as reflected in patient instructions    Estimated body mass index is 18.5 kg/m  as calculated from the following:    Height as of this encounter: 1.683 m (5' 6.25\").    Weight as of this encounter: 52.4 kg (115 lb 8 oz).        She reports that she has been smoking cigarettes. She has never used smokeless tobacco.  Tobacco Cessation Action Plan:   Information offered: Patient not interested at this time      Appropriate preventive services were discussed with this patient, including applicable screening as appropriate for cardiovascular disease, diabetes, osteopenia/osteoporosis, and glaucoma.  As appropriate for age/gender, discussed screening for colorectal cancer, prostate cancer, breast cancer, and cervical cancer. Checklist reviewing preventive services available has been given to the patient.    Reviewed patients plan of care and provided an AVS. The Basic Care Plan (routine screening as documented in Health Maintenance) for Gabby meets the Care Plan requirement. This Care Plan has been established and reviewed with the Patient.    Counseling Resources:  ATP IV Guidelines  Pooled Cohorts Equation Calculator  Breast Cancer Risk Calculator  Breast Cancer: Medication to Reduce Risk  FRAX Risk Assessment  ICSI Preventive Guidelines  Dietary Guidelines for Americans, 2010  [employer]s MyPlate  ASA Prophylaxis  Lung CA Screening    Anuradha Benton MD  [facility]    Identified Health Risks:  "

## 2021-09-13 NOTE — PATIENT INSTRUCTIONS
Patient Education   Personalized Prevention Plan  You are due for the preventive services outlined below.  Your care team is available to assist you in scheduling these services.  If you have already completed any of these items, please share that information with your care team to update in your medical record.  Health Maintenance Due   Topic Date Due     Osteoporosis Screening  Never done     ANNUAL REVIEW OF HM ORDERS  Never done     COVID-19 Vaccine (1) Never done     Hepatitis C Screening  Never done     Zoster (Shingles) Vaccine (1 of 2) Never done     Colorectal Cancer Screening  09/11/2017     Mammogram  06/04/2020     Pneumococcal Vaccine (1 of 2 - PPSV23) 11/04/2020     PHQ-2  01/01/2021     Flu Vaccine (1) 09/01/2021     FALL RISK ASSESSMENT  09/09/2021     Annual Wellness Visit  09/09/2021

## 2021-09-24 ENCOUNTER — ANCILLARY PROCEDURE (OUTPATIENT)
Dept: MAMMOGRAPHY | Facility: CLINIC | Age: 71
End: 2021-09-24
Attending: FAMILY MEDICINE
Payer: COMMERCIAL

## 2021-09-24 DIAGNOSIS — Z12.31 ENCOUNTER FOR SCREENING MAMMOGRAM FOR MALIGNANT NEOPLASM OF BREAST: ICD-10-CM

## 2021-09-24 PROCEDURE — 77067 SCR MAMMO BI INCL CAD: CPT

## 2021-09-29 ENCOUNTER — HOSPITAL ENCOUNTER (OUTPATIENT)
Dept: BONE DENSITY | Facility: CLINIC | Age: 71
Discharge: HOME | End: 2021-09-29
Attending: FAMILY MEDICINE | Admitting: FAMILY MEDICINE
Payer: COMMERCIAL

## 2021-09-29 DIAGNOSIS — Z78.0 POSTMENOPAUSAL: ICD-10-CM

## 2021-09-29 PROCEDURE — 77080 DXA BONE DENSITY AXIAL: CPT

## 2021-10-27 ENCOUNTER — E-VISIT (OUTPATIENT)
Dept: FAMILY MEDICINE | Facility: CLINIC | Age: 71
End: 2021-10-27
Payer: COMMERCIAL

## 2021-10-27 DIAGNOSIS — R05.9 COUGH: Primary | ICD-10-CM

## 2021-10-27 PROCEDURE — 99422 OL DIG E/M SVC 11-20 MIN: CPT | Performed by: FAMILY MEDICINE

## 2021-10-27 RX ORDER — BENZONATATE 100 MG/1
100 CAPSULE ORAL 3 TIMES DAILY PRN
Qty: 30 CAPSULE | Refills: 0 | Status: SHIPPED | OUTPATIENT
Start: 2021-10-27 | End: 2021-12-03

## 2021-10-27 NOTE — PATIENT INSTRUCTIONS
"  Dear Gabby Donis    After reviewing your responses, I've been able to diagnose you with \"Bronchitis\" which is a common infection of your lungs that is nearly always caused by a virus. The virus causes swelling and irritation of the air passages of your lungs which leads to cough. The illness spreads from your nose and throat to your windpipe and airways. It is often called a \"chest cold\" and can last up to 2 weeks, but is not a serious illness. Exposure to cigarette smoke usually makes this significantly worse.     I sent the Tessalon Perles to the pharmacy which should help with the cough!     To treat bronchitis, the main thing to do is drink lots of fluids and rest. Cough medications over-the-counter such as mucinex, robitussin or \"cold and sinus\" medications can be helpful. Ibuprofen and Tylenol also help with fevers or aching feelings that you often have with this kind of illness. Do not take ibuprofen if you have kidney disease, stomach ulcers or allergy to aspirin.     Bronchitis is most often highly contagious as viruses are spread through the air or touch. Avoid contact with others who may become infected, particularly children, the elderly and those whose immune systems might be weak.     If your symptoms worsen, you develop chest pain or shortness of breath, fevers over 101, or are not improving in 5 days, please contact your primary care provider for an appointment or visit any of our convenient Walk-in Care or Urgent Care Centers to be seen which can be found on our website here.    Thanks again for choosing us as your health care partner,    Anuradha Benton MD  "

## 2021-12-03 ENCOUNTER — E-VISIT (OUTPATIENT)
Dept: FAMILY MEDICINE | Facility: CLINIC | Age: 71
End: 2021-12-03
Payer: COMMERCIAL

## 2021-12-03 DIAGNOSIS — R05.9 COUGH: ICD-10-CM

## 2021-12-03 PROCEDURE — 99422 OL DIG E/M SVC 11-20 MIN: CPT | Performed by: FAMILY MEDICINE

## 2021-12-03 RX ORDER — BENZONATATE 100 MG/1
100 CAPSULE ORAL 3 TIMES DAILY PRN
Qty: 30 CAPSULE | Refills: 0 | Status: SHIPPED | OUTPATIENT
Start: 2021-12-03 | End: 2023-01-20

## 2021-12-03 RX ORDER — CODEINE PHOSPHATE/GUAIFENESIN 10-100MG/5
5 LIQUID (ML) ORAL EVERY 4 HOURS PRN
Qty: 236 ML | Refills: 1 | Status: SHIPPED | OUTPATIENT
Start: 2021-12-03 | End: 2023-01-20

## 2021-12-23 ENCOUNTER — IMMUNIZATION (OUTPATIENT)
Dept: FAMILY MEDICINE | Facility: CLINIC | Age: 71
End: 2021-12-23
Payer: COMMERCIAL

## 2021-12-23 DIAGNOSIS — Z23 HIGH PRIORITY FOR 2019-NCOV VACCINE: Primary | ICD-10-CM

## 2021-12-23 PROCEDURE — 0004A COVID-19,PF,PFIZER (12+ YRS): CPT

## 2021-12-23 PROCEDURE — 99207 PR NO CHARGE LOS: CPT

## 2021-12-23 PROCEDURE — 91300 COVID-19,PF,PFIZER (12+ YRS): CPT

## 2022-01-04 ENCOUNTER — IMMUNIZATION (OUTPATIENT)
Dept: FAMILY MEDICINE | Facility: CLINIC | Age: 72
End: 2022-01-04
Payer: COMMERCIAL

## 2022-01-04 DIAGNOSIS — Z23 NEED FOR PROPHYLACTIC VACCINATION AND INOCULATION AGAINST INFLUENZA: Primary | ICD-10-CM

## 2022-01-04 PROCEDURE — 99207 PR NO CHARGE NURSE ONLY: CPT

## 2022-01-04 PROCEDURE — 90662 IIV NO PRSV INCREASED AG IM: CPT

## 2022-02-21 ENCOUNTER — OFFICE VISIT (OUTPATIENT)
Dept: ORTHOPEDICS | Facility: CLINIC | Age: 72
End: 2022-02-21
Payer: COMMERCIAL

## 2022-02-21 ENCOUNTER — ANCILLARY PROCEDURE (OUTPATIENT)
Dept: GENERAL RADIOLOGY | Facility: CLINIC | Age: 72
End: 2022-02-21
Attending: ORTHOPAEDIC SURGERY
Payer: COMMERCIAL

## 2022-02-21 VITALS
DIASTOLIC BLOOD PRESSURE: 70 MMHG | HEART RATE: 80 BPM | SYSTOLIC BLOOD PRESSURE: 127 MMHG | WEIGHT: 123 LBS | RESPIRATION RATE: 16 BRPM | BODY MASS INDEX: 19.77 KG/M2 | HEIGHT: 66 IN | OXYGEN SATURATION: 97 %

## 2022-02-21 DIAGNOSIS — M25.561 ACUTE PAIN OF RIGHT KNEE: Primary | ICD-10-CM

## 2022-02-21 PROCEDURE — 99213 OFFICE O/P EST LOW 20 MIN: CPT | Mod: 25 | Performed by: ORTHOPAEDIC SURGERY

## 2022-02-21 PROCEDURE — 73562 X-RAY EXAM OF KNEE 3: CPT | Mod: RT | Performed by: RADIOLOGY

## 2022-02-21 PROCEDURE — 20610 DRAIN/INJ JOINT/BURSA W/O US: CPT | Mod: RT | Performed by: ORTHOPAEDIC SURGERY

## 2022-02-21 RX ORDER — METHYLPREDNISOLONE ACETATE 80 MG/ML
80 INJECTION, SUSPENSION INTRA-ARTICULAR; INTRALESIONAL; INTRAMUSCULAR; SOFT TISSUE
Status: SHIPPED | OUTPATIENT
Start: 2022-02-21

## 2022-02-21 RX ORDER — BUPIVACAINE HYDROCHLORIDE 2.5 MG/ML
4 INJECTION, SOLUTION INFILTRATION; PERINEURAL
Status: SHIPPED | OUTPATIENT
Start: 2022-02-21

## 2022-02-21 RX ADMIN — BUPIVACAINE HYDROCHLORIDE 4 ML: 2.5 INJECTION, SOLUTION INFILTRATION; PERINEURAL at 13:30

## 2022-02-21 RX ADMIN — METHYLPREDNISOLONE ACETATE 80 MG: 80 INJECTION, SUSPENSION INTRA-ARTICULAR; INTRALESIONAL; INTRAMUSCULAR; SOFT TISSUE at 13:30

## 2022-02-21 ASSESSMENT — PAIN SCALES - GENERAL: PAINLEVEL: EXTREME PAIN (9)
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
## 2022-03-22 DIAGNOSIS — G25.81 RESTLESS LEG SYNDROME: ICD-10-CM

## 2022-03-23 RX ORDER — PRAMIPEXOLE DIHYDROCHLORIDE 0.25 MG/1
0.25 TABLET ORAL AT BEDTIME
Qty: 90 TABLET | Refills: 2 | Status: SHIPPED | OUTPATIENT
Start: 2022-03-23 | End: 2023-11-08

## 2022-03-23 NOTE — TELEPHONE ENCOUNTER
Routing refill request to provider for review/approval because:  Labs not current:  CBC & ALT > year    Favian Kennedy RN

## 2022-09-01 DIAGNOSIS — G47.00 INSOMNIA, UNSPECIFIED TYPE: ICD-10-CM

## 2022-09-01 RX ORDER — TRAZODONE HYDROCHLORIDE 100 MG/1
100 TABLET ORAL AT BEDTIME
Qty: 90 TABLET | Refills: 0 | Status: SHIPPED | OUTPATIENT
Start: 2022-09-01 | End: 2022-11-28

## 2022-09-01 NOTE — TELEPHONE ENCOUNTER
Routing refill request to provider for review/approval because:  Patient needs to be seen because:  Due for annual exam    Favian Kennedy RN

## 2022-10-26 ENCOUNTER — OFFICE VISIT (OUTPATIENT)
Dept: FAMILY MEDICINE | Facility: CLINIC | Age: 72
End: 2022-10-26
Payer: COMMERCIAL

## 2022-10-26 VITALS
HEART RATE: 70 BPM | TEMPERATURE: 97.6 F | RESPIRATION RATE: 12 BRPM | DIASTOLIC BLOOD PRESSURE: 70 MMHG | HEIGHT: 66 IN | BODY MASS INDEX: 20.17 KG/M2 | SYSTOLIC BLOOD PRESSURE: 124 MMHG | OXYGEN SATURATION: 97 % | WEIGHT: 125.5 LBS

## 2022-10-26 DIAGNOSIS — Z13.1 SCREENING FOR DIABETES MELLITUS: ICD-10-CM

## 2022-10-26 DIAGNOSIS — E78.5 HYPERLIPIDEMIA LDL GOAL <130: ICD-10-CM

## 2022-10-26 DIAGNOSIS — J44.9 CHRONIC OBSTRUCTIVE PULMONARY DISEASE, UNSPECIFIED COPD TYPE (H): ICD-10-CM

## 2022-10-26 DIAGNOSIS — D68.51 FACTOR V LEIDEN MUTATION (H): ICD-10-CM

## 2022-10-26 DIAGNOSIS — Z11.59 NEED FOR HEPATITIS C SCREENING TEST: ICD-10-CM

## 2022-10-26 DIAGNOSIS — G25.81 RESTLESS LEG SYNDROME: ICD-10-CM

## 2022-10-26 DIAGNOSIS — Z23 ENCOUNTER FOR IMMUNIZATION: ICD-10-CM

## 2022-10-26 DIAGNOSIS — Z00.00 ENCOUNTER FOR MEDICARE ANNUAL WELLNESS EXAM: Primary | ICD-10-CM

## 2022-10-26 DIAGNOSIS — Z12.11 SCREEN FOR COLON CANCER: ICD-10-CM

## 2022-10-26 LAB
ANION GAP SERPL CALCULATED.3IONS-SCNC: 7 MMOL/L (ref 7–15)
BUN SERPL-MCNC: 11.6 MG/DL (ref 8–23)
CALCIUM SERPL-MCNC: 9.1 MG/DL (ref 8.8–10.2)
CHLORIDE SERPL-SCNC: 104 MMOL/L (ref 98–107)
CHOLEST SERPL-MCNC: 207 MG/DL
CREAT SERPL-MCNC: 0.64 MG/DL (ref 0.51–0.95)
DEPRECATED HCO3 PLAS-SCNC: 27 MMOL/L (ref 22–29)
GFR SERPL CREATININE-BSD FRML MDRD: >90 ML/MIN/1.73M2
GLUCOSE SERPL-MCNC: 79 MG/DL (ref 70–99)
HDLC SERPL-MCNC: 76 MG/DL
LDLC SERPL CALC-MCNC: 118 MG/DL
NONHDLC SERPL-MCNC: 131 MG/DL
POTASSIUM SERPL-SCNC: 4.7 MMOL/L (ref 3.4–5.3)
SODIUM SERPL-SCNC: 138 MMOL/L (ref 136–145)
TRIGL SERPL-MCNC: 66 MG/DL

## 2022-10-26 PROCEDURE — G0008 ADMIN INFLUENZA VIRUS VAC: HCPCS | Performed by: FAMILY MEDICINE

## 2022-10-26 PROCEDURE — G0439 PPPS, SUBSEQ VISIT: HCPCS | Mod: 25 | Performed by: FAMILY MEDICINE

## 2022-10-26 PROCEDURE — 36415 COLL VENOUS BLD VENIPUNCTURE: CPT | Performed by: FAMILY MEDICINE

## 2022-10-26 PROCEDURE — 90662 IIV NO PRSV INCREASED AG IM: CPT | Performed by: FAMILY MEDICINE

## 2022-10-26 PROCEDURE — 80048 BASIC METABOLIC PNL TOTAL CA: CPT | Performed by: FAMILY MEDICINE

## 2022-10-26 PROCEDURE — G0009 ADMIN PNEUMOCOCCAL VACCINE: HCPCS | Performed by: FAMILY MEDICINE

## 2022-10-26 PROCEDURE — 90677 PCV20 VACCINE IM: CPT | Performed by: FAMILY MEDICINE

## 2022-10-26 PROCEDURE — 80061 LIPID PANEL: CPT | Performed by: FAMILY MEDICINE

## 2022-10-26 RX ORDER — PRAMIPEXOLE DIHYDROCHLORIDE 0.5 MG/1
0.5 TABLET ORAL AT BEDTIME
Qty: 90 TABLET | Refills: 3 | Status: SHIPPED | OUTPATIENT
Start: 2022-10-26 | End: 2023-01-20

## 2022-10-26 RX ORDER — ALBUTEROL SULFATE 90 UG/1
AEROSOL, METERED RESPIRATORY (INHALATION)
Qty: 18 G | Refills: 3 | Status: SHIPPED | OUTPATIENT
Start: 2022-10-26

## 2022-10-26 ASSESSMENT — ENCOUNTER SYMPTOMS
CONSTIPATION: 0
PALPITATIONS: 0
NAUSEA: 0
FEVER: 0
HEADACHES: 0
ARTHRALGIAS: 0
MYALGIAS: 0
DYSURIA: 0
COUGH: 0
HEARTBURN: 0
ABDOMINAL PAIN: 0
WEAKNESS: 0
NERVOUS/ANXIOUS: 0
CHILLS: 0
EYE PAIN: 0
DIZZINESS: 0
HEMATURIA: 0
HEMATOCHEZIA: 0
BREAST MASS: 0
DIARRHEA: 0
SORE THROAT: 0
JOINT SWELLING: 0
PARESTHESIAS: 0
FREQUENCY: 0
SHORTNESS OF BREATH: 0

## 2022-10-26 ASSESSMENT — ACTIVITIES OF DAILY LIVING (ADL): CURRENT_FUNCTION: NO ASSISTANCE NEEDED

## 2022-10-26 NOTE — PATIENT INSTRUCTIONS
Patient Education   Personalized Prevention Plan  You are due for the preventive services outlined below.  Your care team is available to assist you in scheduling these services.  If you have already completed any of these items, please share that information with your care team to update in your medical record.  Health Maintenance Due   Topic Date Due     ANNUAL REVIEW OF HM ORDERS  Never done     Hepatitis B Vaccine (1 of 3 - 3-dose series) Never done     Hepatitis C Screening  Never done     Zoster (Shingles) Vaccine (1 of 2) Never done     LUNG CANCER SCREENING  Never done     Colorectal Cancer Screening  09/11/2017     Pneumococcal Vaccine (2 - PPSV23 if available, else PCV20) 09/09/2021     COVID-19 Vaccine (2 - Pfizer series) 01/13/2022     Flu Vaccine (1) 09/01/2022     Talk to your care team about options to quit tobacco use.  09/13/2022

## 2022-10-26 NOTE — PROGRESS NOTES
"SUBJECTIVE:   Gabby is a 71 year old who presents for Preventive Visit.    Patient has been advised of split billing requirements and indicates understanding: Yes  Are you in the first 12 months of your Medicare coverage?  No    Healthy Habits:     In general, how would you rate your overall health?  Good    Frequency of exercise:  2-3 days/week    Duration of exercise:  30-45 minutes    Do you usually eat at least 4 servings of fruit and vegetables a day, include whole grains    & fiber and avoid regularly eating high fat or \"junk\" foods?  Yes    Taking medications regularly:  Yes    Medication side effects:  None    Ability to successfully perform activities of daily living:  No assistance needed    Home Safety:  No safety concerns identified    Hearing Impairment:  No hearing concerns    In the past 6 months, have you been bothered by leaking of urine? Yes    In general, how would you rate your overall mental or emotional health?  Excellent      PHQ-2 Total Score: 0    Additional concerns today:  No    Do you feel safe in your environment? Yes    Have you ever done Advance Care Planning? (For example, a Health Directive, POLST, or a discussion with a medical provider or your loved ones about your wishes): No, advance care planning information given to patient to review.  Patient plans to discuss their wishes with loved ones or provider.      Fall risk  Fallen 2 or more times in the past year?: No  Any fall with injury in the past year?: No    Cognitive Screening   1) Repeat 3 items (Leader, Season, Table)    2) Clock draw: NORMAL  3) 3 item recall: Recalls 3 objects  Results: 3 items recalled: COGNITIVE IMPAIRMENT LESS LIKELY    Mini-CogTM Copyright AB[employer] Noe. Licensed by the author for use in [facility]; reprinted with permission ([email or web address]). All rights reserved.      Do you have sleep apnea, excessive snoring or daytime drowsiness?: no    Reviewed and updated as needed this visit by clinical " staff                  Reviewed and updated as needed this visit by Provider                 Social History     Tobacco Use     Smoking status: Some Days     Types: Cigarettes     Smokeless tobacco: Never     Tobacco comments:     less than 1/2 pack a week; X 40 years   Substance Use Topics     Alcohol use: Not on file     Comment: very rare         Alcohol Use 10/26/2022   Prescreen: >3 drinks/day or >7 drinks/week? No     Patient feels her restless leg syndrome is not well controlled.  Wondering about increasing Mirapex.    Current providers sharing in care for this patient include:   Patient Care Team:  Anuradha Benton MD as PCP - General (Family Medicine)  Anuradha Benton MD as Assigned PCP  Messi Landers MD as Assigned Musculoskeletal Provider    The following health maintenance items are reviewed in Epic and correct as of today:  Health Maintenance   Topic Date Due     ANNUAL REVIEW OF  ORDERS  Never done     HEPATITIS B IMMUNIZATION (1 of 3 - 3-dose series) Never done     HEPATITIS C SCREENING  Never done     ZOSTER IMMUNIZATION (1 of 2) Never done     LUNG CANCER SCREENING  Never done     COLORECTAL CANCER SCREENING  09/11/2017     Pneumococcal Vaccine: 65+ Years (2 - PPSV23 if available, else PCV20) 09/09/2021     COVID-19 Vaccine (2 - Pfizer series) 01/13/2022     INFLUENZA VACCINE (1) 09/01/2022     NICOTINE/TOBACCO CESSATION COUNSELING Q 1 YR  09/13/2022     MAMMO SCREENING  09/24/2023     MEDICARE ANNUAL WELLNESS VISIT  10/26/2023     FALL RISK ASSESSMENT  10/26/2023     LIPID  09/13/2026     ADVANCE CARE PLANNING  09/19/2026     DTAP/TDAP/TD IMMUNIZATION (4 - Td or Tdap) 06/19/2029     DEXA  09/29/2036     PHQ-2 (once per calendar year)  Completed     IPV IMMUNIZATION  Aged Out     MENINGITIS IMMUNIZATION  Aged Out     Lab work is in process    Discussed COVID-19 booster vaccination.  Discussed shingles vaccine.      Review of Systems   Constitutional: Negative for chills and  "fever.   HENT: Negative for congestion, ear pain, hearing loss and sore throat.    Eyes: Negative for pain and visual disturbance.   Respiratory: Negative for cough and shortness of breath.    Cardiovascular: Negative for chest pain, palpitations and peripheral edema.   Gastrointestinal: Negative for abdominal pain, constipation, diarrhea, heartburn, hematochezia and nausea.   Breasts:  Negative for tenderness, breast mass and discharge.   Genitourinary: Negative for dysuria, frequency, genital sores, hematuria, pelvic pain, urgency, vaginal bleeding and vaginal discharge.   Musculoskeletal: Negative for arthralgias, joint swelling and myalgias.   Skin: Negative for rash.   Neurological: Negative for dizziness, weakness, headaches and paresthesias.   Psychiatric/Behavioral: Negative for mood changes. The patient is not nervous/anxious.          OBJECTIVE:   There were no vitals taken for this visit. Estimated body mass index is 19.7 kg/m  as calculated from the following:    Height as of 2/21/22: 1.683 m (5' 6.25\").    Weight as of 2/21/22: 55.8 kg (123 lb).  Physical Exam    Physical Exam:  General Appearance: Alert, cooperative, no distress, appears stated age   Head: Normocephalic, without obvious abnormality, atraumatic  Eyes: PERRL, conjunctiva/corneas clear, EOM's intact   Ears: Normal TM's and external ear canals, both ears  Neck: Supple, symmetrical, trachea midline, no adenopathy;  thyroid: not enlarged, symmetric, no tenderness/mass/nodules  Back: Symmetric, no curvature, ROM normal,  Lungs: Clear to auscultation bilaterally, respirations unlabored  Breasts: No breast masses, tenderness, asymmetry, or nipple discharge.  Heart: Regular rate and rhythm, S1 and S2 normal, no murmur, rub, or gallop  Abdomen: Soft, non-tender, bowel sounds active all four quadrants,  no masses, no organomegaly  Extremities: Extremities normal, atraumatic, no cyanosis or edema  Skin: Skin color, texture, turgor normal, no rashes " or lesions  Lymph nodes: Cervical, supraclavicular, and axillary nodes normal and   Neurologic: Normal        ASSESSMENT / PLAN:   1. Encounter for Medicare annual wellness exam  - REVIEW OF HEALTH MAINTENANCE PROTOCOL ORDERS  - Pneumococcal 20 Valent Conjugate (PCV20)  - INFLUENZA, QUAD, HIGH DOSE, PF, 65YR + (FLUZONE HD)    2. Need for hepatitis C screening test  Patient declined screening.    3. Screen for colon cancer  Patient states that she has a fit test at home and will let us know the results when she gets them back    4. Chronic obstructive pulmonary disease, unspecified COPD type (H)  No issues with breathing.  Not on a daily inhaler.  Albuterol sent to the pharmacy to use as needed  - albuterol (PROAIR HFA/PROVENTIL HFA/VENTOLIN HFA) 108 (90 Base) MCG/ACT inhaler; INHALE TWO PUFFS BY MOUTH EVERY 6 HOURS AS NEEDED FOR WHEEZING  Dispense: 18 g; Refill: 3    5. Factor V Leiden mutation (H)  Stable with no concerns.    6. Restless leg syndrome  Increase Mirapex to 0.5 mg at night  - pramipexole (MIRAPEX) 0.5 MG tablet; Take 1 tablet (0.5 mg) by mouth At Bedtime  Dispense: 90 tablet; Refill: 3  - Basic metabolic panel  (Ca, Cl, CO2, Creat, Gluc, K, Na, BUN); Future  - Basic metabolic panel  (Ca, Cl, CO2, Creat, Gluc, K, Na, BUN)    7. Hyperlipidemia LDL goal <130  Patient does not wish to take statin medication.  Discussed that we do not have to check cholesterol at that case however she wishes to follow.  - Lipid panel reflex to direct LDL Non-fasting; Future  - Lipid panel reflex to direct LDL Non-fasting    8. Screening for diabetes mellitus  - Basic metabolic panel  (Ca, Cl, CO2, Creat, Gluc, K, Na, BUN); Future  - Basic metabolic panel  (Ca, Cl, CO2, Creat, Gluc, K, Na, BUN)    9. Encounter for immunization  - Pneumococcal 20 Valent Conjugate (PCV20)  - INFLUENZA, QUAD, HIGH DOSE, PF, 65YR + (FLUZONE HD)      Patient has been advised of split billing requirements and indicates understanding:  "Yes      COUNSELING:  Reviewed preventive health counseling, as reflected in patient instructions    Estimated body mass index is 19.7 kg/m  as calculated from the following:    Height as of 2/21/22: 1.683 m (5' 6.25\").    Weight as of 2/21/22: 55.8 kg (123 lb).        She reports that she has been smoking cigarettes. She has never used smokeless tobacco.  Nicotine/Tobacco Cessation Plan:   Information offered: Patient not interested at this time      Appropriate preventive services were discussed with this patient, including applicable screening as appropriate for cardiovascular disease, diabetes, osteopenia/osteoporosis, and glaucoma.  As appropriate for age/gender, discussed screening for colorectal cancer, prostate cancer, breast cancer, and cervical cancer. Checklist reviewing preventive services available has been given to the patient.    Reviewed patients plan of care and provided an AVS. The Basic Care Plan (routine screening as documented in Health Maintenance) for Gabby meets the Care Plan requirement. This Care Plan has been established and reviewed with the Patient.    Counseling Resources:  ATP IV Guidelines  Pooled Cohorts Equation Calculator  Breast Cancer Risk Calculator  Breast Cancer: Medication to Reduce Risk  FRAX Risk Assessment  ICSI Preventive Guidelines  Dietary Guidelines for Americans, 2010  [employer] MyPlate  ASA Prophylaxis  Lung CA Screening    Anuradha Benton MD  [facility]    Identified Health Risks:  "

## 2022-11-16 ENCOUNTER — E-VISIT (OUTPATIENT)
Dept: FAMILY MEDICINE | Facility: CLINIC | Age: 72
End: 2022-11-16
Payer: COMMERCIAL

## 2022-11-16 DIAGNOSIS — R05.9 COUGH, UNSPECIFIED TYPE: Primary | ICD-10-CM

## 2022-11-16 PROCEDURE — 99207 PR NON-BILLABLE SERV PER CHARTING: CPT | Performed by: FAMILY MEDICINE

## 2022-11-17 NOTE — PATIENT INSTRUCTIONS
Dear Gabby Donis,    We are sorry you are not feeling well. Based on the responses you provided, it is recommended that you be seen in-person in urgent care so we can better evaluate your symptoms. Please click here to find the nearest urgent care location to you.   You will not be charged for this Visit. Thank you for trusting us with your care.    Anuradha Benton MD

## 2022-11-27 DIAGNOSIS — G47.00 INSOMNIA, UNSPECIFIED TYPE: ICD-10-CM

## 2022-11-28 RX ORDER — TRAZODONE HYDROCHLORIDE 100 MG/1
100 TABLET ORAL AT BEDTIME
Qty: 90 TABLET | Refills: 0 | Status: SHIPPED | OUTPATIENT
Start: 2022-11-28 | End: 2023-01-20

## 2023-01-20 ENCOUNTER — VIRTUAL VISIT (OUTPATIENT)
Dept: FAMILY MEDICINE | Facility: CLINIC | Age: 73
End: 2023-01-20
Payer: COMMERCIAL

## 2023-01-20 DIAGNOSIS — J44.9 CHRONIC OBSTRUCTIVE PULMONARY DISEASE, UNSPECIFIED COPD TYPE (H): ICD-10-CM

## 2023-01-20 DIAGNOSIS — D68.51 FACTOR V LEIDEN MUTATION (H): ICD-10-CM

## 2023-01-20 DIAGNOSIS — J20.9 ACUTE BRONCHITIS, UNSPECIFIED ORGANISM: Primary | ICD-10-CM

## 2023-01-20 PROCEDURE — 99442 PR PHYSICIAN TELEPHONE EVALUATION 11-20 MIN: CPT | Performed by: FAMILY MEDICINE

## 2023-01-20 RX ORDER — AZITHROMYCIN 250 MG/1
TABLET, FILM COATED ORAL
Qty: 6 TABLET | Refills: 0 | Status: SHIPPED | OUTPATIENT
Start: 2023-01-20 | End: 2023-01-25

## 2023-01-20 RX ORDER — PREDNISONE 20 MG/1
TABLET ORAL
Qty: 15 TABLET | Refills: 0 | Status: SHIPPED | OUTPATIENT
Start: 2023-01-20 | End: 2023-01-30

## 2023-01-20 RX ORDER — ALBUTEROL SULFATE 0.83 MG/ML
2.5 SOLUTION RESPIRATORY (INHALATION) EVERY 6 HOURS PRN
Qty: 90 ML | Refills: 1 | Status: SHIPPED | OUTPATIENT
Start: 2023-01-20

## 2023-01-20 ASSESSMENT — ENCOUNTER SYMPTOMS
FATIGUE: 1
FEVER: 0
ABDOMINAL PAIN: 0
WHEEZING: 1
CHILLS: 0
SHORTNESS OF BREATH: 1
ACTIVITY CHANGE: 0
COUGH: 1

## 2023-01-23 ENCOUNTER — TELEPHONE (OUTPATIENT)
Dept: FAMILY MEDICINE | Facility: CLINIC | Age: 73
End: 2023-01-23
Payer: COMMERCIAL

## 2023-01-23 NOTE — TELEPHONE ENCOUNTER
General Call      Reason for Call:  Medical question    What are your questions or concerns:  Pt called and stated that she got a missed call about her nebulizer machine that she'll need to pick it up from the hospital. But pt stated that when she spoke with Dr. Lopez, it was suppose to be sent to her home. Please check and call pt back, Dr. Lopez on PTO. Routing to [facility], thanks!    Date of last appointment with provider: 1/20/2023    Could we send this information to you in [facility] or would you prefer to receive a phone call?:   Patient would prefer a phone call   Okay to leave a detailed message?: Yes at Home number on file [phone] (home)

## 2023-03-19 ENCOUNTER — MYC REFILL (OUTPATIENT)
Dept: FAMILY MEDICINE | Facility: CLINIC | Age: 73
End: 2023-03-19
Payer: COMMERCIAL

## 2023-03-19 DIAGNOSIS — J30.1 ALLERGIC RHINITIS DUE TO POLLEN, UNSPECIFIED SEASONALITY: Primary | ICD-10-CM

## 2023-03-20 NOTE — TELEPHONE ENCOUNTER
Routing refill request to provider for review/approval because:  PCP to determine  Ashish Woodruff RN

## 2023-03-21 RX ORDER — AZELASTINE HYDROCHLORIDE 0.5 MG/ML
1 SOLUTION/ DROPS OPHTHALMIC 2 TIMES DAILY
Qty: 6 ML | Refills: 4 | Status: SHIPPED | OUTPATIENT
Start: 2023-03-21 | End: 2023-08-15

## 2023-04-04 DIAGNOSIS — J30.1 ALLERGIC RHINITIS DUE TO POLLEN, UNSPECIFIED SEASONALITY: ICD-10-CM

## 2023-04-05 RX ORDER — AZELASTINE HYDROCHLORIDE 0.5 MG/ML
1 SOLUTION/ DROPS OPHTHALMIC 2 TIMES DAILY
Qty: 6 ML | Refills: 4 | OUTPATIENT
Start: 2023-04-05

## 2023-04-06 DIAGNOSIS — J30.1 ALLERGIC RHINITIS DUE TO POLLEN, UNSPECIFIED SEASONALITY: ICD-10-CM

## 2023-04-07 RX ORDER — AZELASTINE HYDROCHLORIDE 0.5 MG/ML
1 SOLUTION/ DROPS OPHTHALMIC 2 TIMES DAILY
Qty: 6 ML | Refills: 4 | OUTPATIENT
Start: 2023-04-07

## 2023-04-30 ENCOUNTER — APPOINTMENT (OUTPATIENT)
Dept: ULTRASOUND IMAGING | Facility: CLINIC | Age: 73
End: 2023-04-30
Attending: EMERGENCY MEDICINE
Payer: COMMERCIAL

## 2023-04-30 ENCOUNTER — HOSPITAL ENCOUNTER (EMERGENCY)
Facility: CLINIC | Age: 73
Discharge: HOME | End: 2023-04-30
Attending: EMERGENCY MEDICINE | Admitting: EMERGENCY MEDICINE
Payer: COMMERCIAL

## 2023-04-30 VITALS
HEIGHT: 68 IN | SYSTOLIC BLOOD PRESSURE: 132 MMHG | WEIGHT: 123 LBS | BODY MASS INDEX: 18.64 KG/M2 | DIASTOLIC BLOOD PRESSURE: 78 MMHG | RESPIRATION RATE: 16 BRPM | TEMPERATURE: 97.8 F | HEART RATE: 72 BPM | OXYGEN SATURATION: 99 %

## 2023-04-30 DIAGNOSIS — Z86.39 H/O IRON DEFICIENCY: ICD-10-CM

## 2023-04-30 DIAGNOSIS — R21 RASH AND NONSPECIFIC SKIN ERUPTION: ICD-10-CM

## 2023-04-30 DIAGNOSIS — M25.473 ANKLE SWELLING, UNSPECIFIED LATERALITY: ICD-10-CM

## 2023-04-30 DIAGNOSIS — L29.9 PRURITUS, UNSPECIFIED: ICD-10-CM

## 2023-04-30 LAB
ANION GAP SERPL CALCULATED.3IONS-SCNC: 6 MMOL/L (ref 7–15)
BASOPHILS # BLD AUTO: 0 10E3/UL (ref 0–0.2)
BASOPHILS NFR BLD AUTO: 1 %
BUN SERPL-MCNC: 13.2 MG/DL (ref 8–23)
CALCIUM SERPL-MCNC: 9.2 MG/DL (ref 8.8–10.2)
CHLORIDE SERPL-SCNC: 104 MMOL/L (ref 98–107)
CREAT SERPL-MCNC: 0.6 MG/DL (ref 0.51–0.95)
D DIMER PPP FEU-MCNC: 0.58 UG/ML FEU (ref 0–0.5)
DEPRECATED HCO3 PLAS-SCNC: 28 MMOL/L (ref 22–29)
EOSINOPHIL # BLD AUTO: 0.2 10E3/UL (ref 0–0.7)
EOSINOPHIL NFR BLD AUTO: 3 %
ERYTHROCYTE [DISTWIDTH] IN BLOOD BY AUTOMATED COUNT: 12.5 % (ref 10–15)
GFR SERPL CREATININE-BSD FRML MDRD: >90 ML/MIN/1.73M2
GLUCOSE SERPL-MCNC: 98 MG/DL (ref 70–99)
HCT VFR BLD AUTO: 38.4 % (ref 35–47)
HGB BLD-MCNC: 12.9 G/DL (ref 11.7–15.7)
HOLD SPECIMEN: NORMAL
IMM GRANULOCYTES # BLD: 0 10E3/UL
IMM GRANULOCYTES NFR BLD: 0 %
LYMPHOCYTES # BLD AUTO: 1.2 10E3/UL (ref 0.8–5.3)
LYMPHOCYTES NFR BLD AUTO: 24 %
MCH RBC QN AUTO: 29.9 PG (ref 26.5–33)
MCHC RBC AUTO-ENTMCNC: 33.6 G/DL (ref 31.5–36.5)
MCV RBC AUTO: 89 FL (ref 78–100)
MONOCYTES # BLD AUTO: 0.7 10E3/UL (ref 0–1.3)
MONOCYTES NFR BLD AUTO: 14 %
NEUTROPHILS # BLD AUTO: 2.8 10E3/UL (ref 1.6–8.3)
NEUTROPHILS NFR BLD AUTO: 58 %
NRBC # BLD AUTO: 0 10E3/UL
NRBC BLD AUTO-RTO: 0 /100
PLATELET # BLD AUTO: 193 10E3/UL (ref 150–450)
POTASSIUM SERPL-SCNC: 3.7 MMOL/L (ref 3.4–5.3)
RBC # BLD AUTO: 4.31 10E6/UL (ref 3.8–5.2)
SODIUM SERPL-SCNC: 138 MMOL/L (ref 136–145)
WBC # BLD AUTO: 4.8 10E3/UL (ref 4–11)

## 2023-04-30 PROCEDURE — 93970 EXTREMITY STUDY: CPT

## 2023-04-30 PROCEDURE — 99284 EMERGENCY DEPT VISIT MOD MDM: CPT | Mod: 25 | Performed by: EMERGENCY MEDICINE

## 2023-04-30 PROCEDURE — 84439 ASSAY OF FREE THYROXINE: CPT

## 2023-04-30 PROCEDURE — 86038 ANTINUCLEAR ANTIBODIES: CPT

## 2023-04-30 PROCEDURE — 99283 EMERGENCY DEPT VISIT LOW MDM: CPT | Performed by: EMERGENCY MEDICINE

## 2023-04-30 PROCEDURE — 84443 ASSAY THYROID STIM HORMONE: CPT

## 2023-04-30 PROCEDURE — 85025 COMPLETE CBC W/AUTO DIFF WBC: CPT | Performed by: EMERGENCY MEDICINE

## 2023-04-30 PROCEDURE — 80053 COMPREHEN METABOLIC PANEL: CPT | Performed by: EMERGENCY MEDICINE

## 2023-04-30 PROCEDURE — 36415 COLL VENOUS BLD VENIPUNCTURE: CPT | Performed by: EMERGENCY MEDICINE

## 2023-04-30 PROCEDURE — 82248 BILIRUBIN DIRECT: CPT

## 2023-04-30 PROCEDURE — 82728 ASSAY OF FERRITIN: CPT

## 2023-04-30 PROCEDURE — 85379 FIBRIN DEGRADATION QUANT: CPT | Performed by: EMERGENCY MEDICINE

## 2023-04-30 ASSESSMENT — ENCOUNTER SYMPTOMS
NAUSEA: 0
HEADACHES: 0
LIGHT-HEADEDNESS: 0
FATIGUE: 0
CHEST TIGHTNESS: 0
APPETITE CHANGE: 0
ABDOMINAL PAIN: 0
SHORTNESS OF BREATH: 0
COUGH: 0
FEVER: 0
VOMITING: 0
CHILLS: 0

## 2023-04-30 ASSESSMENT — ACTIVITIES OF DAILY LIVING (ADL): ADLS_ACUITY_SCORE: 35

## 2023-04-30 NOTE — LETTER
May 12, 2023      Gabby Donis  [address] [place] [id number]        Dear ,    We are writing to inform you of your test results.    The ferritin (iron stores) and liver enzymes are all normal.     The thyroid levels are slightly off - not enough to warrant treatment but something we should recheck in 3 months. It's possible they are slightly 'off' due to whatever is causing your rash but it is hard to say at this point. Please schedule a repeat lab visit in 8-12 weeks so we can follow up on this     Olga Fuchs    Resulted Orders   Hepatic panel (Albumin, ALT, AST, Bili, Alk Phos, TP)   Result Value Ref Range    Protein Total 6.5 6.4 - 8.3 g/dL    Albumin 4.5 3.5 - 5.2 g/dL    Bilirubin Total 0.4 <=1.2 mg/dL    Alkaline Phosphatase 78 35 - 104 U/L    AST 20 10 - 35 U/L      Comment:      Specimen is hemolyzed which can falsely elevate AST. Analysis of a non-hemolyzed specimen may result in a lower value.  Specimen is hemolyzed which can falsely elevate AST. Analysis of a non-hemolyzed specimen may result in a lower value.    ALT 15 10 - 35 U/L    Bilirubin Direct <0.20 0.00 - 0.30 mg/dL   Ferritin   Result Value Ref Range    Ferritin 83 11 - 328 ng/mL   TSH with free T4 reflex   Result Value Ref Range    TSH 4.61 (H) 0.30 - 4.20 uIU/mL   T4 free   Result Value Ref Range    Free T4 1.39 0.90 - 1.70 ng/dL
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
## 2023-05-01 NOTE — DISCHARGE INSTRUCTIONS
The exact cause of your symptoms is unclear at this time.  The rash is likely due to a mild viral infection.  If your symptoms continue or worsen, please follow-up with your primary care doctor as soon as possible or return to the emergency department for a repeat evaluation.

## 2023-05-01 NOTE — ED TRIAGE NOTES
Bilateral leg swelling since Thursday, also noted redness/blotchy. Pt states hx of Factor V.      Triage Assessment     Row Name 04/30/23 2021       Triage Assessment (Adult)    Airway WDL WDL       Respiratory WDL    Respiratory WDL WDL       Skin Circulation/Temperature WDL    Skin Circulation/Temperature WDL WDL       Cardiac WDL    Cardiac WDL WDL       Peripheral/Neurovascular WDL    Peripheral Neurovascular WDL X  LE swelling/blotchy       Cognitive/Neuro/Behavioral WDL    Cognitive/Neuro/Behavioral WDL WDL

## 2023-05-02 ENCOUNTER — OFFICE VISIT (OUTPATIENT)
Dept: FAMILY MEDICINE | Facility: CLINIC | Age: 73
End: 2023-05-02
Payer: COMMERCIAL

## 2023-05-02 VITALS
HEART RATE: 83 BPM | DIASTOLIC BLOOD PRESSURE: 70 MMHG | BODY MASS INDEX: 18.94 KG/M2 | SYSTOLIC BLOOD PRESSURE: 122 MMHG | HEIGHT: 68 IN | TEMPERATURE: 98.3 F | OXYGEN SATURATION: 97 % | WEIGHT: 125 LBS

## 2023-05-02 DIAGNOSIS — L29.9 PRURITUS, UNSPECIFIED: ICD-10-CM

## 2023-05-02 DIAGNOSIS — Z86.39 H/O IRON DEFICIENCY: ICD-10-CM

## 2023-05-02 DIAGNOSIS — R21 RASH AND NONSPECIFIC SKIN ERUPTION: Primary | ICD-10-CM

## 2023-05-02 LAB
ALBUMIN SERPL BCG-MCNC: 4.5 G/DL (ref 3.5–5.2)
ALP SERPL-CCNC: 78 U/L (ref 35–104)
ALT SERPL W P-5'-P-CCNC: 15 U/L (ref 10–35)
AST SERPL W P-5'-P-CCNC: 20 U/L (ref 10–35)
BILIRUB DIRECT SERPL-MCNC: <0.2 MG/DL (ref 0–0.3)
BILIRUB SERPL-MCNC: 0.4 MG/DL
FERRITIN SERPL-MCNC: 83 NG/ML (ref 11–328)
PROT SERPL-MCNC: 6.5 G/DL (ref 6.4–8.3)
T4 FREE SERPL-MCNC: 1.39 NG/DL (ref 0.9–1.7)
TSH SERPL DL<=0.005 MIU/L-ACNC: 4.61 UIU/ML (ref 0.3–4.2)

## 2023-05-02 PROCEDURE — 99214 OFFICE O/P EST MOD 30 MIN: CPT | Performed by: PHYSICIAN ASSISTANT

## 2023-05-02 RX ORDER — PREDNISONE 20 MG/1
TABLET ORAL
Qty: 7 TABLET | Refills: 0 | Status: SHIPPED | OUTPATIENT
Start: 2023-05-02 | End: 2023-11-13

## 2023-05-02 NOTE — PROGRESS NOTES
Assessment & Plan       ICD-10-CM    1. Rash and nonspecific skin eruption  R21 Hepatic panel (Albumin, ALT, AST, Bili, Alk Phos, TP)     Helicobacter pylori Antigen Stool     TSH with free T4 reflex     Anti Nuclear Darlene IgG by IFA with Reflex     predniSONE (DELTASONE) 20 MG tablet      2. Pruritus, unspecified  L29.9 TSH with free T4 reflex     predniSONE (DELTASONE) 20 MG tablet      3. H/O iron deficiency  Z86.39 Ferritin        Unclear etiology - there really isn't a noticeable rash that is present on the skin - possibly a fine lacy appearing rash on the legs but nothing raised or really noticeable. Itching is keeping her awake at night and is wondering if prednisone would help - discussed that I am not sure but we can try understanding that prednisone can cause her to stay awake for other reasons. Check labs - specifically liver enzymes and h pylori as this can cause systemic itching in some. Will also check thyroid labs. Follow up if not improving       MED REC REQUIRED  Post Medication Reconciliation Status: discharge medications reconciled and changed, per note/orders      Sameera Mcgowan PA-C  [facility] BRIAN Pierre is a 72 year old, presenting for the following health issues:  ER F/U         View : No data to display.              HPI     ED/UC Followup:    Facility:  [facility]  Date of visit: 4/30/2023  Reason for visit: Ankle Swelling, Rash  Current Status: It is staying the same. It is itching at night. She was told it was viral but she thinks more is going on. Her fingers are also starting to swell and have some redness.    Noticed swelling in her ankles and rash in her legs - ED work up negative  Still feels as though her ankles are more swollen than normal and rash, while not worse, is still very irritating as it is itchy       Review of Systems   Remainder of ROS obtained and found to be negative other than that which was documented above       "  Objective    /70   Pulse 83   Temp 98.3  F (36.8  C) (Tympanic)   Ht 1.727 m (5' 8\")   Wt 56.7 kg (125 lb)   SpO2 97%   BMI 19.01 kg/m    Body mass index is 19.01 kg/m .  Physical Exam   GENERAL: healthy, alert and no distress  SKIN: faint lacy appearing rash on lower extremities around knees and up inner thighs, otherwise no noticeable rash elsewhere  EDEMA: no noticeable swelling in ankles noted    Diagnostic tests:   pending          "

## 2023-05-03 ENCOUNTER — APPOINTMENT (OUTPATIENT)
Dept: LAB | Facility: CLINIC | Age: 73
End: 2023-05-03
Payer: COMMERCIAL

## 2023-05-03 DIAGNOSIS — E03.8 SUBCLINICAL HYPOTHYROIDISM: Primary | ICD-10-CM

## 2023-05-03 DIAGNOSIS — R21 RASH AND NONSPECIFIC SKIN ERUPTION: ICD-10-CM

## 2023-05-03 PROCEDURE — 87338 HPYLORI STOOL AG IA: CPT

## 2023-05-04 LAB
ANA SER QL IF: NEGATIVE
H PYLORI AG STL QL IA: NEGATIVE

## 2023-08-15 DIAGNOSIS — J30.1 ALLERGIC RHINITIS DUE TO POLLEN, UNSPECIFIED SEASONALITY: ICD-10-CM

## 2023-08-15 RX ORDER — AZELASTINE HYDROCHLORIDE 0.5 MG/ML
1 SOLUTION/ DROPS OPHTHALMIC 2 TIMES DAILY
Qty: 6 ML | Refills: 3 | Status: SHIPPED | OUTPATIENT
Start: 2023-08-15 | End: 2024-04-12

## 2023-08-25 ENCOUNTER — PATIENT OUTREACH (OUTPATIENT)
Dept: CARE COORDINATION | Facility: CLINIC | Age: 73
End: 2023-08-25
Payer: COMMERCIAL

## 2023-09-22 ENCOUNTER — PATIENT OUTREACH (OUTPATIENT)
Dept: CARE COORDINATION | Facility: CLINIC | Age: 73
End: 2023-09-22
Payer: COMMERCIAL

## 2023-11-08 RX ORDER — PRAMIPEXOLE DIHYDROCHLORIDE 0.5 MG/1
0.5 TABLET ORAL AT BEDTIME
COMMUNITY
Start: 2023-05-16 | End: 2023-11-13

## 2023-11-13 ENCOUNTER — OFFICE VISIT (OUTPATIENT)
Dept: FAMILY MEDICINE | Facility: CLINIC | Age: 73
End: 2023-11-13
Payer: COMMERCIAL

## 2023-11-13 ENCOUNTER — LAB (OUTPATIENT)
Dept: FAMILY MEDICINE | Facility: CLINIC | Age: 73
End: 2023-11-13

## 2023-11-13 VITALS
SYSTOLIC BLOOD PRESSURE: 110 MMHG | HEIGHT: 66 IN | WEIGHT: 133.25 LBS | TEMPERATURE: 98.1 F | RESPIRATION RATE: 12 BRPM | HEART RATE: 66 BPM | DIASTOLIC BLOOD PRESSURE: 68 MMHG | BODY MASS INDEX: 21.41 KG/M2 | OXYGEN SATURATION: 97 %

## 2023-11-13 DIAGNOSIS — Z12.11 SCREEN FOR COLON CANCER: ICD-10-CM

## 2023-11-13 DIAGNOSIS — Z12.31 VISIT FOR SCREENING MAMMOGRAM: ICD-10-CM

## 2023-11-13 DIAGNOSIS — Z23 NEED FOR SHINGLES VACCINE: ICD-10-CM

## 2023-11-13 DIAGNOSIS — Z00.00 ENCOUNTER FOR MEDICARE ANNUAL WELLNESS EXAM: Primary | ICD-10-CM

## 2023-11-13 DIAGNOSIS — M54.50 CHRONIC BILATERAL LOW BACK PAIN WITHOUT SCIATICA: ICD-10-CM

## 2023-11-13 DIAGNOSIS — G25.81 RESTLESS LEG SYNDROME: ICD-10-CM

## 2023-11-13 DIAGNOSIS — G89.29 CHRONIC BILATERAL LOW BACK PAIN WITHOUT SCIATICA: ICD-10-CM

## 2023-11-13 DIAGNOSIS — Z29.11 NEED FOR VACCINATION AGAINST RESPIRATORY SYNCYTIAL VIRUS: ICD-10-CM

## 2023-11-13 DIAGNOSIS — D68.51 FACTOR V LEIDEN MUTATION (H): ICD-10-CM

## 2023-11-13 DIAGNOSIS — M25.551 HIP PAIN, RIGHT: ICD-10-CM

## 2023-11-13 DIAGNOSIS — Z13.220 SCREENING FOR HYPERLIPIDEMIA: ICD-10-CM

## 2023-11-13 DIAGNOSIS — G47.00 INSOMNIA, UNSPECIFIED TYPE: ICD-10-CM

## 2023-11-13 DIAGNOSIS — J44.9 CHRONIC OBSTRUCTIVE PULMONARY DISEASE, UNSPECIFIED COPD TYPE (H): ICD-10-CM

## 2023-11-13 DIAGNOSIS — R79.89 ELEVATED TSH: ICD-10-CM

## 2023-11-13 LAB
ALBUMIN SERPL BCG-MCNC: 4.2 G/DL (ref 3.5–5.2)
ALP SERPL-CCNC: 75 U/L (ref 35–104)
ALT SERPL W P-5'-P-CCNC: 13 U/L (ref 0–50)
ANION GAP SERPL CALCULATED.3IONS-SCNC: 8 MMOL/L (ref 7–15)
AST SERPL W P-5'-P-CCNC: 15 U/L (ref 0–45)
BILIRUB SERPL-MCNC: 0.5 MG/DL
BUN SERPL-MCNC: 20.2 MG/DL (ref 8–23)
CALCIUM SERPL-MCNC: 8.9 MG/DL (ref 8.8–10.2)
CHLORIDE SERPL-SCNC: 105 MMOL/L (ref 98–107)
CHOLEST SERPL-MCNC: 192 MG/DL
CREAT SERPL-MCNC: 0.67 MG/DL (ref 0.51–0.95)
DEPRECATED HCO3 PLAS-SCNC: 27 MMOL/L (ref 22–29)
EGFRCR SERPLBLD CKD-EPI 2021: >90 ML/MIN/1.73M2
ERYTHROCYTE [DISTWIDTH] IN BLOOD BY AUTOMATED COUNT: 11.9 % (ref 10–15)
GLUCOSE SERPL-MCNC: 101 MG/DL (ref 70–99)
HCT VFR BLD AUTO: 37.4 % (ref 35–47)
HDLC SERPL-MCNC: 85 MG/DL
HGB BLD-MCNC: 12.8 G/DL (ref 11.7–15.7)
LDLC SERPL CALC-MCNC: 97 MG/DL
MCH RBC QN AUTO: 30.5 PG (ref 26.5–33)
MCHC RBC AUTO-ENTMCNC: 34.2 G/DL (ref 31.5–36.5)
MCV RBC AUTO: 89 FL (ref 78–100)
NONHDLC SERPL-MCNC: 107 MG/DL
PLATELET # BLD AUTO: 195 10E3/UL (ref 150–450)
POTASSIUM SERPL-SCNC: 4.4 MMOL/L (ref 3.4–5.3)
PROT SERPL-MCNC: 6.5 G/DL (ref 6.4–8.3)
RBC # BLD AUTO: 4.19 10E6/UL (ref 3.8–5.2)
SODIUM SERPL-SCNC: 140 MMOL/L (ref 135–145)
TRIGL SERPL-MCNC: 50 MG/DL
TSH SERPL DL<=0.005 MIU/L-ACNC: 3.85 UIU/ML (ref 0.3–4.2)
WBC # BLD AUTO: 5.4 10E3/UL (ref 4–11)

## 2023-11-13 PROCEDURE — 80061 LIPID PANEL: CPT | Performed by: FAMILY MEDICINE

## 2023-11-13 PROCEDURE — G0439 PPPS, SUBSEQ VISIT: HCPCS | Performed by: FAMILY MEDICINE

## 2023-11-13 PROCEDURE — 80053 COMPREHEN METABOLIC PANEL: CPT | Performed by: FAMILY MEDICINE

## 2023-11-13 PROCEDURE — 84443 ASSAY THYROID STIM HORMONE: CPT | Performed by: FAMILY MEDICINE

## 2023-11-13 PROCEDURE — 99214 OFFICE O/P EST MOD 30 MIN: CPT | Mod: 25 | Performed by: FAMILY MEDICINE

## 2023-11-13 PROCEDURE — 85027 COMPLETE CBC AUTOMATED: CPT | Performed by: FAMILY MEDICINE

## 2023-11-13 PROCEDURE — 36415 COLL VENOUS BLD VENIPUNCTURE: CPT | Performed by: FAMILY MEDICINE

## 2023-11-13 RX ORDER — CYCLOBENZAPRINE HCL 10 MG
TABLET ORAL
Qty: 30 TABLET | Refills: 0 | Status: SHIPPED | OUTPATIENT
Start: 2023-11-13 | End: 2023-12-10

## 2023-11-13 RX ORDER — TRAZODONE HYDROCHLORIDE 100 MG/1
100 TABLET ORAL AT BEDTIME
Qty: 90 TABLET | Refills: 3 | Status: SHIPPED | OUTPATIENT
Start: 2023-11-13 | End: 2024-04-12

## 2023-11-13 RX ORDER — PRAMIPEXOLE DIHYDROCHLORIDE 0.5 MG/1
0.5 TABLET ORAL AT BEDTIME
Qty: 90 TABLET | Refills: 2 | OUTPATIENT
Start: 2023-11-13

## 2023-11-13 RX ORDER — RESPIRATORY SYNCYTIAL VIRUS VACCINE 120MCG/0.5
0.5 KIT INTRAMUSCULAR ONCE
Qty: 1 EACH | Refills: 0 | Status: CANCELLED | OUTPATIENT
Start: 2023-11-13 | End: 2023-11-13

## 2023-11-13 RX ORDER — PRAMIPEXOLE DIHYDROCHLORIDE 0.5 MG/1
0.5 TABLET ORAL AT BEDTIME
Qty: 90 TABLET | Refills: 3 | Status: SHIPPED | OUTPATIENT
Start: 2023-11-13 | End: 2024-04-12

## 2023-11-13 ASSESSMENT — ENCOUNTER SYMPTOMS
FREQUENCY: 0
FEVER: 0
SORE THROAT: 0
JOINT SWELLING: 0
CHILLS: 0
PALPITATIONS: 0
DIZZINESS: 0
MYALGIAS: 0
HEARTBURN: 0
DIARRHEA: 0
HEMATURIA: 0
NAUSEA: 0
NERVOUS/ANXIOUS: 0
DYSURIA: 0
WEAKNESS: 0
SHORTNESS OF BREATH: 0
ARTHRALGIAS: 0
HEMATOCHEZIA: 0
COUGH: 0
HEADACHES: 0
ABDOMINAL PAIN: 0
PARESTHESIAS: 0
EYE PAIN: 0
BREAST MASS: 0
CONSTIPATION: 0

## 2023-11-13 ASSESSMENT — ACTIVITIES OF DAILY LIVING (ADL): CURRENT_FUNCTION: NO ASSISTANCE NEEDED
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
## 2023-11-13 NOTE — PATIENT INSTRUCTIONS
Patient Education   Personalized Prevention Plan  You are due for the preventive services outlined below.  Your care team is available to assist you in scheduling these services.  If you have already completed any of these items, please share that information with your care team to update in your medical record.  Health Maintenance Due   Topic Date Due     Breathing Capacity Test  Never done     COPD Action Plan  Never done     Zoster (Shingles) Vaccine (1 of 2) Never done     LUNG CANCER SCREENING  Never done     RSV VACCINE (Pregnancy & 60+) (1 - 1-dose 60+ series) Never done     Colorectal Cancer Screening  10/30/2017     Flu Vaccine (1) 09/01/2023     COVID-19 Vaccine (2 - 2023-24 season) 09/01/2023     ANNUAL REVIEW OF HM ORDERS  10/26/2023     Mammogram  09/24/2023     Talk to your care team about options to quit tobacco use.  10/26/2023

## 2023-11-27 ENCOUNTER — ANCILLARY PROCEDURE (OUTPATIENT)
Dept: MAMMOGRAPHY | Facility: HOSPITAL | Age: 73
End: 2023-11-27
Attending: FAMILY MEDICINE
Payer: COMMERCIAL

## 2023-11-27 DIAGNOSIS — Z12.31 VISIT FOR SCREENING MAMMOGRAM: ICD-10-CM

## 2023-11-27 PROCEDURE — 77067 SCR MAMMO BI INCL CAD: CPT

## 2023-12-05 LAB — NONINV COLON CA DNA+OCC BLD SCRN STL QL: NEGATIVE

## 2023-12-09 DIAGNOSIS — G89.29 CHRONIC BILATERAL LOW BACK PAIN WITHOUT SCIATICA: ICD-10-CM

## 2023-12-09 DIAGNOSIS — M54.50 CHRONIC BILATERAL LOW BACK PAIN WITHOUT SCIATICA: ICD-10-CM

## 2023-12-10 RX ORDER — CYCLOBENZAPRINE HCL 10 MG
TABLET ORAL
Qty: 30 TABLET | Refills: 0 | Status: SHIPPED | OUTPATIENT
Start: 2023-12-10 | End: 2024-01-13

## 2024-01-13 DIAGNOSIS — M54.50 CHRONIC BILATERAL LOW BACK PAIN WITHOUT SCIATICA: ICD-10-CM

## 2024-01-13 DIAGNOSIS — G89.29 CHRONIC BILATERAL LOW BACK PAIN WITHOUT SCIATICA: ICD-10-CM

## 2024-01-13 RX ORDER — CYCLOBENZAPRINE HCL 10 MG
TABLET ORAL
Qty: 30 TABLET | Refills: 0 | Status: SHIPPED | OUTPATIENT
Start: 2024-01-13

## 2024-04-08 PROBLEM — J06.9 URI WITH COUGH AND CONGESTION: Status: ACTIVE | Noted: 2023-09-28

## 2024-04-12 ENCOUNTER — OFFICE VISIT (OUTPATIENT)
Dept: FAMILY MEDICINE | Facility: CLINIC | Age: 74
End: 2024-04-12
Payer: COMMERCIAL

## 2024-04-12 VITALS
TEMPERATURE: 98.3 F | RESPIRATION RATE: 16 BRPM | SYSTOLIC BLOOD PRESSURE: 132 MMHG | BODY MASS INDEX: 21.94 KG/M2 | WEIGHT: 136.5 LBS | DIASTOLIC BLOOD PRESSURE: 78 MMHG | HEIGHT: 66 IN | HEART RATE: 78 BPM | OXYGEN SATURATION: 94 %

## 2024-04-12 DIAGNOSIS — J30.1 ALLERGIC RHINITIS DUE TO POLLEN, UNSPECIFIED SEASONALITY: ICD-10-CM

## 2024-04-12 DIAGNOSIS — R82.90 ABNORMAL URINE ODOR: Primary | ICD-10-CM

## 2024-04-12 DIAGNOSIS — G47.00 INSOMNIA, UNSPECIFIED TYPE: ICD-10-CM

## 2024-04-12 DIAGNOSIS — G25.81 RESTLESS LEG SYNDROME: ICD-10-CM

## 2024-04-12 DIAGNOSIS — J44.9 CHRONIC OBSTRUCTIVE PULMONARY DISEASE, UNSPECIFIED COPD TYPE (H): ICD-10-CM

## 2024-04-12 DIAGNOSIS — R10.11 ABDOMINAL PAIN, RIGHT UPPER QUADRANT: ICD-10-CM

## 2024-04-12 DIAGNOSIS — D68.51 FACTOR V LEIDEN MUTATION (H): ICD-10-CM

## 2024-04-12 LAB
ALBUMIN UR-MCNC: NEGATIVE MG/DL
APPEARANCE UR: CLEAR
BILIRUB UR QL STRIP: NEGATIVE
CLUE CELLS: NORMAL
COLOR UR AUTO: ABNORMAL
GLUCOSE UR STRIP-MCNC: NEGATIVE MG/DL
HGB UR QL STRIP: ABNORMAL
KETONES UR STRIP-MCNC: ABNORMAL MG/DL
LEUKOCYTE ESTERASE UR QL STRIP: NEGATIVE
NITRATE UR QL: NEGATIVE
PH UR STRIP: 5.5 [PH] (ref 5–7)
SP GR UR STRIP: >=1.03 (ref 1–1.03)
TRICHOMONAS, WET PREP: NORMAL
UROBILINOGEN UR STRIP-ACNC: 0.2 E.U./DL
WBC'S/HIGH POWER FIELD, WET PREP: NORMAL
YEAST, WET PREP: NORMAL

## 2024-04-12 PROCEDURE — 99214 OFFICE O/P EST MOD 30 MIN: CPT | Performed by: FAMILY MEDICINE

## 2024-04-12 PROCEDURE — 87210 SMEAR WET MOUNT SALINE/INK: CPT | Performed by: FAMILY MEDICINE

## 2024-04-12 PROCEDURE — 81003 URINALYSIS AUTO W/O SCOPE: CPT | Performed by: FAMILY MEDICINE

## 2024-04-12 RX ORDER — TRAZODONE HYDROCHLORIDE 100 MG/1
100 TABLET ORAL AT BEDTIME
Qty: 90 TABLET | Refills: 3 | Status: SHIPPED | OUTPATIENT
Start: 2024-04-12

## 2024-04-12 RX ORDER — RESPIRATORY SYNCYTIAL VIRUS VACCINE 120MCG/0.5
0.5 KIT INTRAMUSCULAR ONCE
Qty: 1 EACH | Refills: 0 | Status: CANCELLED | OUTPATIENT
Start: 2024-04-12 | End: 2024-04-12

## 2024-04-12 RX ORDER — PRAMIPEXOLE DIHYDROCHLORIDE 0.5 MG/1
0.5 TABLET ORAL AT BEDTIME
Qty: 90 TABLET | Refills: 3 | Status: SHIPPED | OUTPATIENT
Start: 2024-04-12

## 2024-04-12 RX ORDER — AZELASTINE HYDROCHLORIDE 0.5 MG/ML
1 SOLUTION/ DROPS OPHTHALMIC 2 TIMES DAILY
Qty: 6 ML | Refills: 3 | Status: SHIPPED | OUTPATIENT
Start: 2024-04-12

## 2024-04-12 NOTE — PROGRESS NOTES
Assessment/Plan:    Gabby Donis is a 73 year old female presenting for:    Abnormal urine odor  Patient I reviewed her urine analysis today which was unremarkable for infection.  Wet prep was also done which was negative.  Encouraged her to drink plenty of fluids and follow-up with me via [employer] message in the next few weeks.  - UA without Microscopic - lab collect  - UA without Microscopic - lab collect  - Wet prep - Clinic Collect    Abdominal pain, right upper quadrant  Etiology is a bit unclear.  Suspect that this is more of a muscular or skeletal issue.  Ultrasound of her abdomen will be done to evaluate her liver as well.  - US Abdomen Complete    Insomnia, unspecified type  Refill trazodone sent to the pharmacy  - traZODone (DESYREL) 100 MG tablet  Dispense: 90 tablet; Refill: 3    Restless leg syndrome  refill  - pramipexole (MIRAPEX) 0.5 MG tablet  Dispense: 90 tablet; Refill: 3    Allergic rhinitis due to pollen, unspecified seasonality  refill  - azelastine (OPTIVAR) 0.05 % ophthalmic solution  Dispense: 6 mL; Refill: 3    COPD: stable - nothing to do at this time    Factor V leiden: stable - nothing to do at this time    Medications Discontinued During This Encounter   Medication Reason    azelastine (OPTIVAR) 0.05 % ophthalmic solution Reorder (No AVS)    pramipexole (MIRAPEX) 0.5 MG tablet Reorder (No AVS)    traZODone (DESYREL) 100 MG tablet Reorder (No AVS)           Chief Complaint:  Bladder Infection and Abdominal Pain        Subjective:   Gabby Donis is a pleasant 73-year-old female who presents to the clinic today with concerns over strongly smelling urine as well as right upper quadrant abdominal pain.    Patient notes that her urine has had a fairly strong odor for the last 3 to 4 weeks.  No pain with urination.  She has not really noticed any vaginal discharge either.    She notes that she has intermittent right upper quadrant abdominal pain that sometimes shoots to her right  lower quadrant.  This often occurs when she bends down or changes positions and she has sharp pain which then self resolves when she changes positions again.  She states that this has actually been improving slightly lately and notes that she has been doing some more core exercises and wonders if this has been helpful.  Because she is still noticing the pain now she would like it looked into.  No swelling.  No jaundice.  Pain is not related to eating and is more related to movement.    12 point review of systems completed and negative except for what has been described above    History   Smoking Status    Former    Types: Cigarettes    Quit date: 8/15/2023   Smokeless Tobacco    Never         Current Outpatient Medications:     albuterol (PROAIR HFA/PROVENTIL HFA/VENTOLIN HFA) 108 (90 Base) MCG/ACT inhaler, INHALE TWO PUFFS BY MOUTH EVERY 6 HOURS AS NEEDED FOR WHEEZING, Disp: 18 g, Rfl: 3    albuterol (PROVENTIL) (2.5 MG/3ML) 0.083% neb solution, Take 1 vial (2.5 mg) by nebulization every 6 hours as needed for shortness of breath, wheezing or cough, Disp: 90 mL, Rfl: 1    ALPRAZolam (XANAX) 0.5 MG tablet, Take 1-2 tablets (0.5-1 mg) by mouth 2 times daily as needed for anxiety, Disp: 10 tablet, Rfl: 0    azelastine (OPTIVAR) 0.05 % ophthalmic solution, Place 1 drop into both eyes 2 times daily, Disp: 6 mL, Rfl: 3    cyclobenzaprine (FLEXERIL) 10 MG tablet, TAKE 1 TABLET BY MOUTH AT BEDTIME AS NEEDED FOR MUSCLE SPASM, Disp: 30 tablet, Rfl: 0    omeprazole (PRILOSEC) 20 MG DR capsule, Take 1 capsule (20 mg) by mouth daily, Disp: 90 capsule, Rfl: 3    pramipexole (MIRAPEX) 0.5 MG tablet, Take 1 tablet (0.5 mg) by mouth at bedtime, Disp: 90 tablet, Rfl: 3    traZODone (DESYREL) 100 MG tablet, Take 1 tablet (100 mg) by mouth at bedtime, Disp: 90 tablet, Rfl: 3    Current Facility-Administered Medications:     bupivacaine (MARCAINE) 0.25 % injection 4 mL, 4 mL, , , Messi Landers MD, 4 mL at 02/21/22 1330     "methylPREDNISolone (DEPO-MEDROL) injection 80 mg, 80 mg, , , Messi Landers MD, 80 mg at 02/21/22 1330      Objective:  Vitals:    04/12/24 0900   BP: 132/78   Pulse: 78   Resp: 16   Temp: 98.3  F (36.8  C)   TempSrc: Tympanic   SpO2: 94%   Weight: 61.9 kg (136 lb 8 oz)   Height: 1.683 m (5' 6.25\")       Body mass index is 21.87 kg/m .    Vital signs reviewed and stable  General: No acute distress  Psych: Appropriate affect  HEENT: moist mucous membranes, pupils equal, round, reactive to light and accomodation, tympanic membranes are pearly grey bilaterally  Lymph: no cervical or supraclavicular lymphadenopathy  Cardiovascular: regular rate and rhythm with no murmur  Pulmonary: clear to auscultation bilaterally with no wheeze  Abdomen: soft, non tender, non distended with normo-active bowel sounds  Extremities: warm and well perfused with no edema  Skin: warm and dry with no rash         This note has been dictated and transcribed using voice recognition software.   Any errors in transcription are unintentional and inherent to the software.  Answers submitted by the patient for this visit:  General Questionnaire (Submitted on 4/12/2024)  Chief Complaint: Chronic problems general questions HPI Form  How many servings of fruits and vegetables do you eat daily?: 2-3  On average, how many sweetened beverages do you drink each day (Examples: soda, juice, sweet tea, etc.  Do NOT count diet or artificially sweetened beverages)?: 1  How many minutes a day do you exercise enough to make your heart beat faster?: 20 to 29  How many days a week do you exercise enough to make your heart beat faster?: 7  How many days per week do you miss taking your medication?: 0  General Concern (Submitted on 4/12/2024)  Chief Complaint: Chronic problems general questions HPI Form  What is the reason for your visit today?: maybe blader infection  When did your symptoms begin?: 1-2 weeks ago  What are your symptoms?: odor when urinate  Are " your symptoms:: Staying the same  Have you had these symptoms before?: No  Is there anything that makes you feel worse?: no  Is there anything that makes you feel better?: no

## 2024-05-30 ENCOUNTER — ANCILLARY PROCEDURE (OUTPATIENT)
Dept: ULTRASOUND IMAGING | Facility: CLINIC | Age: 74
End: 2024-05-30
Attending: FAMILY MEDICINE
Payer: COMMERCIAL

## 2024-05-30 DIAGNOSIS — R10.11 ABDOMINAL PAIN, RIGHT UPPER QUADRANT: ICD-10-CM

## 2024-05-30 PROCEDURE — 76700 US EXAM ABDOM COMPLETE: CPT | Mod: TC | Performed by: RADIOLOGY

## 2024-09-19 ENCOUNTER — OFFICE VISIT (OUTPATIENT)
Dept: FAMILY MEDICINE | Facility: CLINIC | Age: 74
End: 2024-09-19
Payer: COMMERCIAL

## 2024-09-19 VITALS
BODY MASS INDEX: 20.89 KG/M2 | TEMPERATURE: 98.4 F | SYSTOLIC BLOOD PRESSURE: 126 MMHG | HEIGHT: 66 IN | OXYGEN SATURATION: 95 % | DIASTOLIC BLOOD PRESSURE: 74 MMHG | WEIGHT: 130 LBS | HEART RATE: 73 BPM

## 2024-09-19 DIAGNOSIS — H92.02 LEFT EAR PAIN: ICD-10-CM

## 2024-09-19 DIAGNOSIS — K13.79 MOUTH SORE: ICD-10-CM

## 2024-09-19 DIAGNOSIS — M54.50 ACUTE LEFT-SIDED LOW BACK PAIN WITHOUT SCIATICA: ICD-10-CM

## 2024-09-19 DIAGNOSIS — N95.2 VAGINAL ATROPHY: ICD-10-CM

## 2024-09-19 DIAGNOSIS — F52.31 INHIBITED FEMALE ORGASM: Primary | ICD-10-CM

## 2024-09-19 DIAGNOSIS — J44.9 CHRONIC OBSTRUCTIVE PULMONARY DISEASE, UNSPECIFIED COPD TYPE (H): ICD-10-CM

## 2024-09-19 PROCEDURE — 99214 OFFICE O/P EST MOD 30 MIN: CPT | Performed by: PHYSICIAN ASSISTANT

## 2024-09-19 RX ORDER — ESTRADIOL 0.1 MG/G
CREAM VAGINAL
Qty: 42.5 G | Refills: 1 | Status: SHIPPED | OUTPATIENT
Start: 2024-09-19

## 2024-09-19 NOTE — PROGRESS NOTES
Assessment & Plan     (F52.31) Inhibited female orgasm  (primary encounter diagnosis)  Comment: possibly a psychologic component in that sex is uncomfortable due to dryness and causing inhibition. Also discussed possibility of pelvic floor muscles contributing. Will treat vaginal atrophy as per below with estrace and can look into pelvic floor therapy (referral made)  Plan: Physical Therapy  Referral            (N95.2) Vaginal atrophy  Comment:   Plan: estradiol (ESTRACE) 0.1 MG/GM vaginal cream            (K13.79) Mouth sore  Comment:   Plan: follow up with dentist. Continue chlorhexidine mouth rinse    (H92.02) Left ear pain  Comment: ear exam normal  Plan: suspect it could be from TMJ issues (possibly clenching/grinding). Advised avoidance of hard, crunchy or chewy (gum) foods and ibuprofen    (M54.50) Acute left-sided low back pain without sciatica  Comment: seems musculoskeletal likely related to overuse/work  Plan: stretching, PT if persistent    (J44.9) Chronic obstructive pulmonary disease, unspecified COPD type (H)  Comment:   Plan: stable                        Subjective   Gabby is a 73 year old, presenting for the following health issues:  Sexual Problem        9/19/2024    11:18 AM   Additional Questions   Roomed by [facility] Helms     History of Present Illness       Reason for visit:  Personal things   She is taking medications regularly.     -She would like to discuss low libido.   Not actually low libido as she wants to have sex. Not an issue with desire  States the issue is that she can't 'finish' - unable to orgasm  Hasn't always been an issue. New in the last couple of years  Thinks she can when she is sleeping/dreaming but unable when with her partner or by herself  Does endorse vaginal dryness and sex is more uncomfortable than it used to be    -She has had back pain in the past. She is not sure if she tweaked something but she is in a lot of pain on her lower left back and buttock  "region.   No specific injury  Pain just above left iliac crest   No radiation down her leg  No weakness in her leg(s)  Started about 2-3 weeks ago. 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   Where her lower dentures meet her gum  Thinks the dentures may not be fitting  as well    -She would like her left ear looked at. She said it has been bothering her and she sometimes feels off balance.   Intermittent - not constant  Will get sharp pains every now and then  Does not feel hearing has changed      Review of Systems  Remainder of ROS obtained and found to be negative other than that which was documented above        Objective    /74   Pulse 73   Temp 98.4  F (36.9  C) (Tympanic)   Ht 1.683 m (5' 6.25\")   Wt 59 kg (130 lb)   SpO2 95%   BMI 20.82 kg/m    Body mass index is 20.82 kg/m .  Physical Exam   GENERAL: alert and no distress  EARS: ear canals and TM's normal  MOUTH: area of excess skin/irritation midline where lower dentures meet gum. Skin intact (no breakdown)  JAW: mild tenderness on palpation over left TMJ  CV: regular rates and rhythm and no peripheral edema  SKIN: no suspicious lesions or rashes    Diagnostic Tests: none        Signed Electronically by: Sameera Mcgowan PA-C    "

## 2024-11-15 PROBLEM — J06.9 URI WITH COUGH AND CONGESTION: Status: RESOLVED | Noted: 2023-09-28 | Resolved: 2024-11-15

## 2024-11-24 SDOH — HEALTH STABILITY: PHYSICAL HEALTH: ON AVERAGE, HOW MANY DAYS PER WEEK DO YOU ENGAGE IN MODERATE TO STRENUOUS EXERCISE (LIKE A BRISK WALK)?: 4 DAYS

## 2024-11-24 SDOH — HEALTH STABILITY: PHYSICAL HEALTH: ON AVERAGE, HOW MANY MINUTES DO YOU ENGAGE IN EXERCISE AT THIS LEVEL?: 60 MIN

## 2024-11-24 ASSESSMENT — SOCIAL DETERMINANTS OF HEALTH (SDOH): HOW OFTEN DO YOU GET TOGETHER WITH FRIENDS OR RELATIVES?: ONCE A WEEK

## 2024-11-25 ENCOUNTER — OFFICE VISIT (OUTPATIENT)
Dept: FAMILY MEDICINE | Facility: CLINIC | Age: 74
End: 2024-11-25
Attending: FAMILY MEDICINE
Payer: COMMERCIAL

## 2024-11-25 VITALS
SYSTOLIC BLOOD PRESSURE: 122 MMHG | DIASTOLIC BLOOD PRESSURE: 70 MMHG | TEMPERATURE: 96.9 F | BODY MASS INDEX: 19.8 KG/M2 | OXYGEN SATURATION: 97 % | RESPIRATION RATE: 12 BRPM | WEIGHT: 126.13 LBS | HEIGHT: 67 IN | HEART RATE: 65 BPM

## 2024-11-25 DIAGNOSIS — R53.83 OTHER FATIGUE: ICD-10-CM

## 2024-11-25 DIAGNOSIS — M54.6 THORACIC BACK PAIN, UNSPECIFIED BACK PAIN LATERALITY, UNSPECIFIED CHRONICITY: ICD-10-CM

## 2024-11-25 DIAGNOSIS — E78.5 HYPERLIPIDEMIA, UNSPECIFIED HYPERLIPIDEMIA TYPE: ICD-10-CM

## 2024-11-25 DIAGNOSIS — G25.81 RESTLESS LEG SYNDROME: ICD-10-CM

## 2024-11-25 DIAGNOSIS — F52.31 INHIBITED FEMALE ORGASM: ICD-10-CM

## 2024-11-25 DIAGNOSIS — Z00.00 ENCOUNTER FOR MEDICARE ANNUAL WELLNESS EXAM: Primary | ICD-10-CM

## 2024-11-25 DIAGNOSIS — E55.9 HYPOVITAMINOSIS D: ICD-10-CM

## 2024-11-25 DIAGNOSIS — J44.9 CHRONIC OBSTRUCTIVE PULMONARY DISEASE, UNSPECIFIED COPD TYPE (H): ICD-10-CM

## 2024-11-25 DIAGNOSIS — R79.89 ELEVATED TSH: ICD-10-CM

## 2024-11-25 DIAGNOSIS — D68.51 FACTOR V LEIDEN MUTATION (H): ICD-10-CM

## 2024-11-25 DIAGNOSIS — R79.89 LOW VITAMIN D LEVEL: ICD-10-CM

## 2024-11-25 DIAGNOSIS — G47.00 INSOMNIA, UNSPECIFIED TYPE: ICD-10-CM

## 2024-11-25 DIAGNOSIS — Z80.49 FAMILY HISTORY OF CERVICAL CANCER: ICD-10-CM

## 2024-11-25 LAB
ALBUMIN SERPL BCG-MCNC: 4.2 G/DL (ref 3.5–5.2)
ALP SERPL-CCNC: 71 U/L (ref 40–150)
ALT SERPL W P-5'-P-CCNC: 13 U/L (ref 0–50)
ANION GAP SERPL CALCULATED.3IONS-SCNC: 10 MMOL/L (ref 7–15)
AST SERPL W P-5'-P-CCNC: 17 U/L (ref 0–45)
BILIRUB SERPL-MCNC: 0.6 MG/DL
BUN SERPL-MCNC: 12.8 MG/DL (ref 8–23)
CALCIUM SERPL-MCNC: 8.8 MG/DL (ref 8.8–10.4)
CHLORIDE SERPL-SCNC: 104 MMOL/L (ref 98–107)
CHOLEST SERPL-MCNC: 193 MG/DL
CREAT SERPL-MCNC: 0.66 MG/DL (ref 0.51–0.95)
EGFRCR SERPLBLD CKD-EPI 2021: >90 ML/MIN/1.73M2
ERYTHROCYTE [DISTWIDTH] IN BLOOD BY AUTOMATED COUNT: 12.4 % (ref 10–15)
FASTING STATUS PATIENT QL REPORTED: YES
FASTING STATUS PATIENT QL REPORTED: YES
GLUCOSE SERPL-MCNC: 83 MG/DL (ref 70–99)
HCO3 SERPL-SCNC: 28 MMOL/L (ref 22–29)
HCT VFR BLD AUTO: 39.4 % (ref 35–47)
HDLC SERPL-MCNC: 79 MG/DL
HGB BLD-MCNC: 13.2 G/DL (ref 11.7–15.7)
LDLC SERPL CALC-MCNC: 102 MG/DL
MCH RBC QN AUTO: 29.9 PG (ref 26.5–33)
MCHC RBC AUTO-ENTMCNC: 33.5 G/DL (ref 31.5–36.5)
MCV RBC AUTO: 89 FL (ref 78–100)
NONHDLC SERPL-MCNC: 114 MG/DL
PLATELET # BLD AUTO: 214 10E3/UL (ref 150–450)
POTASSIUM SERPL-SCNC: 3.7 MMOL/L (ref 3.4–5.3)
PROT SERPL-MCNC: 6.6 G/DL (ref 6.4–8.3)
RBC # BLD AUTO: 4.42 10E6/UL (ref 3.8–5.2)
SODIUM SERPL-SCNC: 142 MMOL/L (ref 135–145)
TRIGL SERPL-MCNC: 60 MG/DL
TSH SERPL DL<=0.005 MIU/L-ACNC: 2.68 UIU/ML (ref 0.3–4.2)
VIT D+METAB SERPL-MCNC: 31 NG/ML (ref 20–50)
WBC # BLD AUTO: 5.4 10E3/UL (ref 4–11)

## 2024-11-25 PROCEDURE — 82306 VITAMIN D 25 HYDROXY: CPT | Performed by: FAMILY MEDICINE

## 2024-11-25 PROCEDURE — 84443 ASSAY THYROID STIM HORMONE: CPT | Performed by: FAMILY MEDICINE

## 2024-11-25 PROCEDURE — 80053 COMPREHEN METABOLIC PANEL: CPT | Performed by: FAMILY MEDICINE

## 2024-11-25 PROCEDURE — 87624 HPV HI-RISK TYP POOLED RSLT: CPT | Mod: GZ | Performed by: FAMILY MEDICINE

## 2024-11-25 PROCEDURE — G0439 PPPS, SUBSEQ VISIT: HCPCS | Performed by: FAMILY MEDICINE

## 2024-11-25 PROCEDURE — 36415 COLL VENOUS BLD VENIPUNCTURE: CPT | Performed by: FAMILY MEDICINE

## 2024-11-25 PROCEDURE — 85027 COMPLETE CBC AUTOMATED: CPT | Performed by: FAMILY MEDICINE

## 2024-11-25 PROCEDURE — 99214 OFFICE O/P EST MOD 30 MIN: CPT | Mod: 25 | Performed by: FAMILY MEDICINE

## 2024-11-25 PROCEDURE — 80061 LIPID PANEL: CPT | Performed by: FAMILY MEDICINE

## 2024-11-25 RX ORDER — PRAMIPEXOLE DIHYDROCHLORIDE 1 MG/1
1 TABLET ORAL DAILY
Qty: 90 TABLET | Refills: 3 | Status: SHIPPED | OUTPATIENT
Start: 2024-11-25

## 2024-11-25 RX ORDER — TRAZODONE HYDROCHLORIDE 100 MG/1
100 TABLET ORAL AT BEDTIME
Qty: 90 TABLET | Refills: 3 | Status: SHIPPED | OUTPATIENT
Start: 2024-11-25

## 2024-11-25 RX ORDER — KETOROLAC TROMETHAMINE 10 MG/1
10 TABLET, FILM COATED ORAL EVERY 6 HOURS PRN
Qty: 20 TABLET | Refills: 0 | Status: SHIPPED | OUTPATIENT
Start: 2024-11-25

## 2024-11-25 NOTE — PATIENT INSTRUCTIONS
Patient Education   Preventive Care Advice   This is general advice given by our system to help you stay healthy. However, your care team may have specific advice just for you. Please talk to your care team about your preventive care needs.  Nutrition  Eat 5 or more servings of fruits and vegetables each day.  Try wheat bread, brown rice and whole grain pasta (instead of white bread, rice, and pasta).  Get enough calcium and vitamin D. Check the label on foods and aim for 100% of the RDA (recommended daily allowance).  Lifestyle  Exercise at least 150 minutes each week  (30 minutes a day, 5 days a week).  Do muscle strengthening activities 2 days a week. These help control your weight and prevent disease.  No smoking.  Wear sunscreen to prevent skin cancer.  Have a dental exam and cleaning every 6 months.  Yearly exams  See your health care team every year to talk about:  Any changes in your health.  Any medicines your care team has prescribed.  Preventive care, family planning, and ways to prevent chronic diseases.  Shots (vaccines)   HPV shots (up to age 26), if you've never had them before.  Hepatitis B shots (up to age 59), if you've never had them before.  COVID-19 shot: Get this shot when it's due.  Flu shot: Get a flu shot every year.  Tetanus shot: Get a tetanus shot every 10 years.  Pneumococcal, hepatitis A, and RSV shots: Ask your care team if you need these based on your risk.  Shingles shot (for age 50 and up)  General health tests  Diabetes screening:  Starting at age 35, Get screened for diabetes at least every 3 years.  If you are younger than age 35, ask your care team if you should be screened for diabetes.  Cholesterol test: At age 39, start having a cholesterol test every 5 years, or more often if advised.  Bone density scan (DEXA): At age 50, ask your care team if you should have this scan for osteoporosis (brittle bones).  Hepatitis C: Get tested at least once in your life.  STIs (sexually  transmitted infections)  Before age 24: Ask your care team if you should be screened for STIs.  After age 24: Get screened for STIs if you're at risk. You are at risk for STIs (including HIV) if:  You are sexually active with more than one person.  You don't use condoms every time.  You or a partner was diagnosed with a sexually transmitted infection.  If you are at risk for HIV, ask about PrEP medicine to prevent HIV.  Get tested for HIV at least once in your life, whether you are at risk for HIV or not.  Cancer screening tests  Cervical cancer screening: If you have a cervix, begin getting regular cervical cancer screening tests starting at age 21.  Breast cancer scan (mammogram): If you've ever had breasts, begin having regular mammograms starting at age 40. This is a scan to check for breast cancer.  Colon cancer screening: It is important to start screening for colon cancer at age 45.  Have a colonoscopy test every 10 years (or more often if you're at risk) Or, ask your provider about stool tests like a FIT test every year or Cologuard test every 3 years.  To learn more about your testing options, visit:   .  For help making a decision, visit:   https://bit.ly/kn71326.  Prostate cancer screening test: If you have a prostate, ask your care team if a prostate cancer screening test (PSA) at age 55 is right for you.  Lung cancer screening: If you are a current or former smoker ages 50 to 80, ask your care team if ongoing lung cancer screenings are right for you.  For informational purposes only. Not to replace the advice of your health care provider. Copyright   2023 [facility]. All rights reserved. Clinically reviewed by the [facility]. [employer] [id number] - REV 01/24.  Learning About Sleeping Well  What does sleeping well mean?     Sleeping well means getting enough sleep to feel good and stay healthy. How much sleep is enough varies among people.  The number of hours you  sleep and how you feel when you wake up are both important. If you do not feel refreshed, you probably need more sleep. Another sign of not getting enough sleep is feeling tired during the day.  Experts recommend that adults get at least 7 or more hours of sleep per day. Children and older adults need more sleep.  Why is getting enough sleep important?  Getting enough quality sleep is a basic part of good health. When your sleep suffers, your physical health, mood, and your thoughts can suffer too. You may find yourself feeling more grumpy or stressed. Not getting enough sleep also can lead to serious problems, including injury, accidents, anxiety, and depression.  What might cause poor sleeping?  Many things can cause sleep problems, including:  Changes to your sleep schedule.  Stress. Stress can be caused by fear about a single event, such as giving a speech. Or you may have ongoing stress, such as worry about work or school.  Depression, anxiety, and other mental or emotional conditions.  Changes in your sleep habits or surroundings. This includes changes that happen where you sleep, such as noise, light, or sleeping in a different bed. It also includes changes in your sleep pattern, such as having jet lag or working a late shift.  Health problems, such as pain, breathing problems, and restless legs syndrome.  Lack of regular exercise.  Using alcohol, nicotine, or caffeine before bed.  How can you help yourself?  Here are some tips that may help you sleep more soundly and wake up feeling more refreshed.  Your sleeping area   Use your bedroom only for sleeping and sex. A bit of light reading may help you fall asleep. But if it doesn't, do your reading elsewhere in the house. Try not to use your TV, computer, smartphone, or tablet while you are in bed.  Be sure your bed is big enough to stretch out comfortably, especially if you have a sleep partner.  Keep your bedroom quiet, dark, and cool. Use curtains, blinds,  "or a sleep mask to block out light. To block out noise, use earplugs, soothing music, or a \"white noise\" machine.  Your evening and bedtime routine   Create a relaxing bedtime routine. You might want to take a warm shower or bath, or listen to soothing music.  Go to bed at the same time every night. And get up at the same time every morning, even if you feel tired.  What to avoid   Limit caffeine (coffee, tea, caffeinated sodas) during the day, and don't have any for at least 6 hours before bedtime.  Avoid drinking alcohol before bedtime. Alcohol can cause you to wake up more often during the night.  Try not to smoke or use tobacco, especially in the evening. Nicotine can keep you awake.  Limit naps during the day, especially close to bedtime.  Avoid lying in bed awake for too long. If you can't fall asleep or if you wake up in the middle of the night and can't get back to sleep within about 20 minutes, get out of bed and go to another room until you feel sleepy.  Avoid taking medicine right before bed that may keep you awake or make you feel hyper or energized. Your doctor can tell you if your medicine may do this and if you can take it earlier in the day.  If you can't sleep   Imagine yourself in a peaceful, pleasant scene. Focus on the details and feelings of being in a place that is relaxing.  Get up and do a quiet or boring activity until you feel sleepy.  Avoid drinking any liquids before going to bed to help prevent waking up often to use the bathroom.  Where can you learn more?  Go to https://www.Mind[employer].net/patiented  Enter J942 in the search box to learn more about \"Learning About Sleeping Well.\"  Current as of: July 10, 2023  Content Version: 14.2 2024 [employer].   Care instructions adapted under license by your healthcare professional. If you have questions about a medical condition or this instruction, always ask your healthcare professional. [employer] disclaims any " warranty or liability for your use of this information.

## 2024-11-25 NOTE — PROGRESS NOTES
Preventive Care Visit  [facility] BRIAN Benton MD, Family Medicine  Nov 25, 2024      Assessment & Plan     Encounter for Medicare annual wellness exam    Chronic obstructive pulmonary disease, unspecified COPD type (H)  Stable - quit smoking  - CBC with Platelets; Future  - CBC with Platelets    Hyperlipidemia, unspecified hyperlipidemia type  - Lipid panel reflex to direct LDL Non-fasting; Future  - COMPREHENSIVE METABOLIC PANEL; Future  - Lipid panel reflex to direct LDL Non-fasting  - COMPREHENSIVE METABOLIC PANEL    Insomnia, unspecified type  refill  - traZODone (DESYREL) 100 MG tablet; Take 1 tablet (100 mg) by mouth at bedtime.    Elevated TSH  - TSH with free T4 reflex; Future  - TSH with free T4 reflex    Factor V Leiden mutation (H)  stable    Restless leg syndrome  Refill - increase to 1mg  - pramipexole (MIRAPEX) 1 MG tablet; Take 1 tablet (1 mg) by mouth daily.    Low vitamin D level  - Vitamin D Deficiency; Future  - Vitamin D Deficiency    Family history of cervical cancer  PAP done today -if normal today I do not think that we need to repeat  - HPV and Gynecologic Cytology Panel - Recommended Age 30-65 Years    Other fatigue  - TSH with free T4 reflex; Future  - TSH with free T4 reflex    Hypovitaminosis D  - Vitamin D Deficiency; Future  - Vitamin D Deficiency    Thoracic back pain, unspecified back pain laterality, unspecified chronicity  Toradol sent to the pharmacy -she will let me know if she would like to see physical therapy  - ketorolac (TORADOL) 10 MG tablet; Take 1 tablet (10 mg) by mouth every 6 hours as needed for moderate pain.    Inhibited female orgasm  Discussed the differences in hormones and anatomy.  Discussed trying some external stimulation such as a vibrator as well.  Discussed seeing our sexual health clinic.    Patient has been advised of split billing requirements and indicates understanding: Yes        Counseling  Appropriate preventive  services were addressed with this patient via screening, questionnaire, or discussion as appropriate for fall prevention, nutrition, physical activity, Tobacco-use cessation, social engagement, weight loss and cognition.  Checklist reviewing preventive services available has been given to the patient.  Reviewed patient's diet, addressing concerns and/or questions.   Discussed possible causes of fatigue.         Art Pierre is a 73 year old, presenting for the following:  Medicare Visit (AWV-Px/Fasting for labs today) and Cold Symptoms (Congestion, runny nose, sneezing- denies any fevers and COVID test was negative- sxs x a wk- seems to be getting better)            SALUD Pierre is a pleasant 73-year-old female who presents to the clinic today for a annual wellness visit.  She is overall doing well.  She works as a  at a restaurant.    She needs refill of her medications including her inhalers.    She mentions she has been very fatigued recently.  She feels as though she sleeps fairly well at night.    Was seen in urgency around for some back pain that was found to be musculoskeletal.  Toradol injection helped significantly.  Pain will flare occasionally.    Some issues with orgasm.  Saw one of my partners who prescribed estrogen cream which has helped with the lubrication in the vaginal area but still continues to have issues with orgasm.  This has not been an issue previously.  Has had some orgasm issues with masturbation but noted that she was able to orgasm and sleep a few times.    Patient's sister has a history of cervical cancer when she was young.  Patient is hopeful to get a Pap smear today.  No history of abnormal Paps.  History of low risk HPV but no history of high risk HPV.        Health Care Directive  Patient does not have a Health Care Directive: Discussed advance care planning with patient; however, patient declined at this time.      11/24/2024   General Health   How would you rate your  overall physical health? Good   Feel stress (tense, anxious, or unable to sleep) Only a little      (!) STRESS CONCERN      11/24/2024   Nutrition   Diet: Regular (no restrictions)    I don't know       Multiple values from one day are sorted in reverse-chronological order         11/24/2024   Exercise   Days per week of moderate/strenous exercise 4 days   Average minutes spent exercising at this level 60 min            11/24/2024   Social Factors   Frequency of gathering with friends or relatives Once a week   Worry food won't last until get money to buy more No   Food not last or not have enough money for food? No   Do you have housing? (Housing is defined as stable permanent housing and does not include staying ouside in a car, in a tent, in an abandoned building, in an overnight shelter, or couch-surfing.) Yes   Are you worried about losing your housing? No   Lack of transportation? No   Unable to get utilities (heat,electricity)? No            11/24/2024   Fall Risk   Fallen 2 or more times in the past year? No     No    Trouble with walking or balance? No     No        Patient-reported    Multiple values from one day are sorted in reverse-chronological order          11/24/2024   Activities of Daily Living- Home Safety   Needs help with the following daily activites None of the above   Safety concerns in the home None of the above            11/24/2024   Dental   Dentist two times every year? Yes            11/24/2024   Hearing Screening   Hearing concerns? None of the above            11/24/2024   Driving Risk Screening   Patient/family members have concerns about driving No            11/24/2024   General Alertness/Fatigue Screening   Have you been more tired than usual lately? (!) YES            11/24/2024   Urinary Incontinence Screening   Bothered by leaking urine in past 6 months No            11/24/2024   TB Screening   Were you born outside of the [place]? No            Today's PHQ-2 Score:       11/24/2024     12:13 PM   PHQ-2 (  [employer])   Q1: Little interest or pleasure in doing things 0    Q2: Feeling down, depressed or hopeless 0    PHQ-2 Score 0    Q1: Little interest or pleasure in doing things Not at all   Q2: Feeling down, depressed or hopeless Not at all   PHQ-2 Score 0       Patient-reported           2024   Substance Use   Alcohol more than 3/day or more than 7/wk No   Do you have a current opioid prescription? No   How severe/bad is pain from 1 to 10? 8/10   Do you use any other substances recreationally? No        Social History     Tobacco Use    Smoking status: Former     Current packs/day: 0.00     Types: Cigarettes     Quit date: 8/15/2023     Years since quittin.2    Smokeless tobacco: Never    Tobacco comments:     less than 1/2 pack a week; X 40 years           2023   LAST FHS-7 RESULTS   1st degree relative breast or ovarian cancer Yes   Any relative bilateral breast cancer No   Any male have breast cancer No   Any ONE woman have BOTH breast AND ovarian cancer No   Any woman with breast cancer before 50yrs No   2 or more relatives with breast AND/OR ovarian cancer No   2 or more relatives with breast AND/OR bowel cancer No           Mammogram Screening - After age 74- determine frequency with patient based on health status, life expectancy and patient goals    ASCVD Risk   The 10-year ASCVD risk score (Samaria WILSON, et al., 2019) is: 14.8%    Values used to calculate the score:      Age: 73 years      Sex: Female      Is Non- : No      Diabetic: No      Tobacco smoker: No      Systolic Blood Pressure: 122 mmHg      Is BP treated: No      HDL Cholesterol: 85 mg/dL      Total Cholesterol: 192 mg/dL            Reviewed and updated as needed this visit by Provider                      Current providers sharing in care for this patient include:  Patient Care Team:  Anuradha Benton MD as PCP - General (Family Medicine)  Anuradha Benton MD  "as Assigned PCP    The following health maintenance items are reviewed in Epic and correct as of today:  Health Maintenance   Topic Date Due    SPIROMETRY  Never done    COPD ACTION PLAN  Never done    ZOSTER IMMUNIZATION (1 of 2) Never done    LUNG CANCER SCREENING  Never done    RSV VACCINE (1 - 1-dose 75+ series) Never done    INFLUENZA VACCINE (1) 09/01/2024    COVID-19 Vaccine (2 - 2024-25 season) 09/01/2024    LIPID  11/13/2024    ANNUAL REVIEW OF HM ORDERS  11/13/2024    MEDICARE ANNUAL WELLNESS VISIT  11/25/2025    FALL RISK ASSESSMENT  11/25/2025    GLUCOSE  11/13/2026    COLORECTAL CANCER SCREENING  11/27/2026    ADVANCE CARE PLANNING  11/13/2028    DTAP/TDAP/TD IMMUNIZATION (4 - Td or Tdap) 06/19/2029    DEXA  09/29/2036    PHQ-2 (once per calendar year)  Completed    Pneumococcal Vaccine: 65+ Years  Completed    HPV IMMUNIZATION  Aged Out    MENINGITIS IMMUNIZATION  Aged Out    RSV MONOCLONAL ANTIBODY  Aged Out    HEPATITIS C SCREENING  Discontinued    MAMMO SCREENING  Discontinued         Review of Systems  Constitutional, HEENT, cardiovascular, pulmonary, GI, , musculoskeletal, neuro, skin, endocrine and psych systems are negative, except as otherwise noted.     Objective    Exam  /70   Pulse 65   Temp 96.9  F (36.1  C) (Tympanic)   Resp 12   Ht 1.689 m (5' 6.5\")   Wt 57.2 kg (126 lb 2 oz)   SpO2 97%   BMI 20.05 kg/m     Estimated body mass index is 20.05 kg/m  as calculated from the following:    Height as of this encounter: 1.689 m (5' 6.5\").    Weight as of this encounter: 57.2 kg (126 lb 2 oz).    Physical Exam    Physical Exam:  General Appearance: Alert, cooperative, no distress, appears stated age   Head: Normocephalic, without obvious abnormality, atraumatic  Eyes: PERRL, conjunctiva/corneas clear, EOM's intact   Ears: Normal TM's and external ear canals, both ears  Nose:Nares normal, septum midline,mucosa normal, no drainage    Throat:Lips, mucosa, and tongue normal; teeth and " gums normal  Neck: Supple, symmetrical, trachea midline, no adenopathy;  thyroid: not enlarged, symmetric, no tenderness/mass/nodules  Back: Symmetric, no curvature, ROM normal,  Lungs: Clear to auscultation bilaterally, respirations unlabored  Breasts: No breast masses, tenderness, asymmetry, or nipple discharge.  Heart: Regular rate and rhythm, S1 and S2 normal, no murmur, rub, or gallop  Abdomen: Soft, non-tender, bowel sounds active all four quadrants,  no masses, no organomegaly  Pelvic:normal external female genitalia, normal appearing vaginal mucosa and cervix  Extremities: Extremities normal, atraumatic, no cyanosis or edema  Skin: Skin color, texture, turgor normal, no rashes or lesions  Lymph nodes: Cervical, supraclavicular, and axillary nodes normal and   Neurologic: Normal          11/25/2024   Mini Cog   Clock Draw Score 2 Normal   3 Item Recall 2 objects recalled   Mini Cog Total Score 4                 Signed Electronically by: Anuradha Benton MD

## 2024-11-26 LAB
HPV HR 12 DNA CVX QL NAA+PROBE: NEGATIVE
HPV16 DNA CVX QL NAA+PROBE: NEGATIVE
HPV18 DNA CVX QL NAA+PROBE: NEGATIVE
HUMAN PAPILLOMA VIRUS FINAL DIAGNOSIS: NORMAL

## 2024-12-02 LAB
BKR AP ASSOCIATED HPV REPORT: NORMAL
BKR LAB AP GYN ADEQUACY: NORMAL
BKR LAB AP GYN INTERPRETATION: NORMAL
BKR LAB AP PREVIOUS ABNORMAL: NORMAL
PATH REPORT.COMMENTS IMP SPEC: NORMAL
PATH REPORT.COMMENTS IMP SPEC: NORMAL
PATH REPORT.RELEVANT HX SPEC: NORMAL

## 2025-01-12 ENCOUNTER — MYC REFILL (OUTPATIENT)
Dept: FAMILY MEDICINE | Facility: CLINIC | Age: 75
End: 2025-01-12
Payer: COMMERCIAL

## 2025-01-12 DIAGNOSIS — M54.6 THORACIC BACK PAIN, UNSPECIFIED BACK PAIN LATERALITY, UNSPECIFIED CHRONICITY: ICD-10-CM

## 2025-01-13 RX ORDER — KETOROLAC TROMETHAMINE 10 MG/1
10 TABLET, FILM COATED ORAL EVERY 6 HOURS PRN
Qty: 20 TABLET | Refills: 0 | Status: SHIPPED | OUTPATIENT
Start: 2025-01-13

## 2025-02-21 ENCOUNTER — MYC REFILL (OUTPATIENT)
Dept: FAMILY MEDICINE | Facility: CLINIC | Age: 75
End: 2025-02-21
Payer: COMMERCIAL

## 2025-02-21 DIAGNOSIS — M54.6 THORACIC BACK PAIN, UNSPECIFIED BACK PAIN LATERALITY, UNSPECIFIED CHRONICITY: ICD-10-CM

## 2025-02-24 RX ORDER — KETOROLAC TROMETHAMINE 10 MG/1
10 TABLET, FILM COATED ORAL EVERY 6 HOURS PRN
Qty: 20 TABLET | Refills: 0 | Status: SHIPPED | OUTPATIENT
Start: 2025-02-24

## 2025-07-14 DIAGNOSIS — N95.2 VAGINAL ATROPHY: ICD-10-CM

## 2025-07-14 RX ORDER — ESTRADIOL 0.1 MG/G
CREAM VAGINAL
Qty: 43 G | Refills: 1 | Status: SHIPPED | OUTPATIENT
Start: 2025-07-14

## 2025-08-04 DIAGNOSIS — N95.2 VAGINAL ATROPHY: ICD-10-CM

## 2025-08-04 RX ORDER — ESTRADIOL 0.1 MG/G
CREAM VAGINAL
Qty: 43 G | Refills: 0 | OUTPATIENT
Start: 2025-08-04

## 2025-08-21 ENCOUNTER — E-VISIT (OUTPATIENT)
Dept: FAMILY MEDICINE | Facility: CLINIC | Age: 75
End: 2025-08-21
Payer: COMMERCIAL

## 2025-08-21 DIAGNOSIS — J32.9 SINUSITIS, UNSPECIFIED CHRONICITY, UNSPECIFIED LOCATION: Primary | ICD-10-CM
